# Patient Record
Sex: MALE | Race: ASIAN | NOT HISPANIC OR LATINO | Employment: OTHER | URBAN - METROPOLITAN AREA
[De-identification: names, ages, dates, MRNs, and addresses within clinical notes are randomized per-mention and may not be internally consistent; named-entity substitution may affect disease eponyms.]

---

## 2017-06-27 ENCOUNTER — OFFICE VISIT (OUTPATIENT)
Dept: INTERNAL MEDICINE | Facility: CLINIC | Age: 47
End: 2017-06-27
Payer: COMMERCIAL

## 2017-06-27 VITALS
WEIGHT: 121.25 LBS | OXYGEN SATURATION: 99 % | TEMPERATURE: 98 F | HEART RATE: 82 BPM | SYSTOLIC BLOOD PRESSURE: 126 MMHG | HEIGHT: 68 IN | BODY MASS INDEX: 18.38 KG/M2 | DIASTOLIC BLOOD PRESSURE: 86 MMHG

## 2017-06-27 DIAGNOSIS — J06.9 VIRAL URI WITH COUGH: Primary | ICD-10-CM

## 2017-06-27 PROCEDURE — 99999 PR PBB SHADOW E&M-EST. PATIENT-LVL III: CPT | Mod: PBBFAC,,, | Performed by: INTERNAL MEDICINE

## 2017-06-27 PROCEDURE — 99213 OFFICE O/P EST LOW 20 MIN: CPT | Mod: S$GLB,,, | Performed by: INTERNAL MEDICINE

## 2017-06-27 NOTE — PROGRESS NOTES
Subjective:       Patient ID: Dolly Torres is a 46 y.o. male.    Chief Complaint: Cough (x 4 days yellow mucus otc cough medicine some relief)    HPI    C/o cough x4-5 days getting progressively worse. Pt's wife, son and daughter all recently ill. + productive cough in AM.   No runny nose, sore throat.  Itchy feeling in throat. No fever, chills, sob, cp.    Concerned b/c 3 y/o son had mycooplasma and daughter and wife were also ill with similar symptoms  Review of Systems   Constitutional: Negative for activity change, appetite change, chills, fatigue, fever and unexpected weight change.   Respiratory: Positive for cough. Negative for chest tightness and shortness of breath.    Cardiovascular: Negative for chest pain and leg swelling.   Gastrointestinal: Negative for abdominal pain.   Neurological: Negative for headaches.   Psychiatric/Behavioral: Negative for dysphoric mood.       Objective:      Physical Exam   Constitutional: He is oriented to person, place, and time. He appears well-developed and well-nourished.   HENT:   Mouth/Throat: Oropharynx is clear and moist. No oropharyngeal exudate.   Tm's clear   Eyes: Right eye exhibits no discharge. Left eye exhibits no discharge. No scleral icterus.   Neck: Normal range of motion. Neck supple. No JVD present. No thyromegaly present.   Cardiovascular: Normal rate, regular rhythm and normal heart sounds.    Pulmonary/Chest: Effort normal and breath sounds normal. No respiratory distress. He has no wheezes. He has no rales.   Abdominal: Soft. He exhibits no mass. There is no tenderness.   Musculoskeletal: He exhibits no edema.   Lymphadenopathy:     He has no cervical adenopathy.   Neurological: He is alert and oriented to person, place, and time.   Psychiatric: He has a normal mood and affect. His behavior is normal.       Assessment:       1. Viral URI with cough        Plan:       Dolly was seen today for cough.    Diagnoses and all orders for this visit:    Viral URI  with cough       Reassurance.  otc cough suppressants, decongestants if post nasal drip bothersome.    Call if no better.

## 2017-09-21 ENCOUNTER — IMMUNIZATION (OUTPATIENT)
Dept: INTERNAL MEDICINE | Facility: CLINIC | Age: 47
End: 2017-09-21
Payer: COMMERCIAL

## 2017-09-21 ENCOUNTER — OFFICE VISIT (OUTPATIENT)
Dept: INTERNAL MEDICINE | Facility: CLINIC | Age: 47
End: 2017-09-21
Payer: COMMERCIAL

## 2017-09-21 VITALS
WEIGHT: 126.75 LBS | HEIGHT: 68 IN | SYSTOLIC BLOOD PRESSURE: 132 MMHG | BODY MASS INDEX: 19.21 KG/M2 | DIASTOLIC BLOOD PRESSURE: 72 MMHG | HEART RATE: 76 BPM

## 2017-09-21 DIAGNOSIS — Z23 NEED FOR INFLUENZA VACCINATION: ICD-10-CM

## 2017-09-21 DIAGNOSIS — Z23 NEED FOR DIPHTHERIA-TETANUS-PERTUSSIS (TDAP) VACCINE: ICD-10-CM

## 2017-09-21 DIAGNOSIS — Z00.00 ANNUAL PHYSICAL EXAM: Primary | ICD-10-CM

## 2017-09-21 DIAGNOSIS — R39.89 ABNORMAL URINE COLOR: ICD-10-CM

## 2017-09-21 DIAGNOSIS — D18.03 HEMANGIOMA OF LIVER: ICD-10-CM

## 2017-09-21 DIAGNOSIS — R10.9 STOMACH DISCOMFORT: ICD-10-CM

## 2017-09-21 LAB
BILIRUB UR QL STRIP: NEGATIVE
CLARITY UR REFRACT.AUTO: ABNORMAL
COLOR UR AUTO: YELLOW
GLUCOSE UR QL STRIP: NEGATIVE
HGB UR QL STRIP: NEGATIVE
KETONES UR QL STRIP: NEGATIVE
LEUKOCYTE ESTERASE UR QL STRIP: NEGATIVE
MICROSCOPIC COMMENT: NORMAL
NITRITE UR QL STRIP: NEGATIVE
PH UR STRIP: 6 [PH] (ref 5–8)
PROT UR QL STRIP: NEGATIVE
SP GR UR STRIP: 1.02 (ref 1–1.03)
URN SPEC COLLECT METH UR: ABNORMAL
UROBILINOGEN UR STRIP-ACNC: NEGATIVE EU/DL
WBC #/AREA URNS AUTO: 1 /HPF (ref 0–5)

## 2017-09-21 PROCEDURE — 81001 URINALYSIS AUTO W/SCOPE: CPT

## 2017-09-21 PROCEDURE — 90715 TDAP VACCINE 7 YRS/> IM: CPT | Mod: S$GLB,,, | Performed by: INTERNAL MEDICINE

## 2017-09-21 PROCEDURE — 99999 PR PBB SHADOW E&M-EST. PATIENT-LVL III: CPT | Mod: PBBFAC,,, | Performed by: INTERNAL MEDICINE

## 2017-09-21 PROCEDURE — 90686 IIV4 VACC NO PRSV 0.5 ML IM: CPT | Mod: S$GLB,,, | Performed by: INTERNAL MEDICINE

## 2017-09-21 PROCEDURE — 90472 IMMUNIZATION ADMIN EACH ADD: CPT | Mod: S$GLB,,, | Performed by: INTERNAL MEDICINE

## 2017-09-21 PROCEDURE — 99396 PREV VISIT EST AGE 40-64: CPT | Mod: 25,S$GLB,, | Performed by: INTERNAL MEDICINE

## 2017-09-21 PROCEDURE — 90471 IMMUNIZATION ADMIN: CPT | Mod: S$GLB,,, | Performed by: INTERNAL MEDICINE

## 2017-09-21 NOTE — PROGRESS NOTES
"Subjective:       Patient ID: Dolly Torres is a 47 y.o. male.    Chief Complaint: Annual Exam    HPI    Last visit with me 07/2016. No new problems or issues. occasionally still with very mild abdominal pain but not severe or increasing in frequency.    Reviewed PMH, PSH, SH, FH, allergies, and medications.     Review of Systems   All other systems reviewed and are negative.      Objective:      Physical Exam   Constitutional: He is oriented to person, place, and time. No distress.   HENT:   Head: Atraumatic.   Right Ear: Tympanic membrane normal.   Left Ear: Tympanic membrane normal.   Mouth/Throat: Oropharynx is clear and moist. No oropharyngeal exudate.   Eyes: Pupils are equal, round, and reactive to light. Right eye exhibits no discharge. Left eye exhibits no discharge.   Neck: Normal range of motion. No thyromegaly present.   Cardiovascular: Normal rate, regular rhythm and normal heart sounds.    Pulmonary/Chest: Effort normal and breath sounds normal. He has no wheezes. He has no rales.   Abdominal: Soft. He exhibits no distension and no mass. There is no tenderness. There is no guarding.   Musculoskeletal: He exhibits no edema or tenderness.   Lymphadenopathy:     He has no cervical adenopathy.   Neurological: He is alert and oriented to person, place, and time.   Skin: Skin is warm and dry. No rash noted.   Psychiatric: He has a normal mood and affect. His behavior is normal.   Nursing note and vitals reviewed.      Vitals:    09/21/17 1427   BP: 132/72   BP Location: Right arm   Patient Position: Sitting   BP Method: Medium (Manual)   Pulse: 76   Weight: 57.5 kg (126 lb 12.2 oz)   Height: 5' 8" (1.727 m)     Body mass index is 19.27 kg/m².    RESULTS: Reviewed labs from last 18 months    The 10-year ASCVD risk score ([place] KELLY Jr., et al., 2013) is: 1.4%    Values used to calculate the score:      Age: 47 years      Sex: Male      Is Non- : No      Diabetic: No      Tobacco smoker: No     "  Systolic Blood Pressure: 132 mmHg      Is BP treated: No      HDL Cholesterol: 84 mg/dL      Total Cholesterol: 207 mg/dL     Assessment:       1. Annual physical exam    2. Hemangioma of liver    3. Need for influenza vaccination    4. Need for diphtheria-tetanus-pertussis (Tdap) vaccine    5. Abnormal urine color    6. Stomach discomfort        Plan:   Dolly was seen today for annual exam.    Diagnoses and all orders for this visit:    Annual physical exam:  Age-appropriate health screening reviewed, indicated tests ordered.   -     Comprehensive metabolic panel; Future  -     Bilirubin, direct; Future    Hemangioma of liver:  Stable, aim for repeat ultrasound in 2019.    Need for influenza vaccination    Need for diphtheria-tetanus-pertussis (Tdap) vaccine  -     Tdap Vaccine    Abnormal urine color:  Assess for prostate or urine problems on labs.  -     Urinalysis  -     Prostate Specific Antigen, Diagnostic; Future    Stomach discomfort:  Mild stomach symptoms, not severe, given recurrent nature will rule out pancreas inflammation. otherwise mild dyspepsia, use over-the-counter meds for relief as needed.  -     Lipase; Future    Other orders  -     Urinalysis Microscopic    Return in about 1 year (around 9/21/2018) for EPP annual exam, fasting labs 1 week prior.   Kurt Fields MD  Internal Medicine    Portions of this note were completed using [employer] dictation software. Please excuse typographical or syntax errors.

## 2017-09-29 ENCOUNTER — LAB VISIT (OUTPATIENT)
Dept: LAB | Facility: HOSPITAL | Age: 47
End: 2017-09-29
Attending: INTERNAL MEDICINE
Payer: COMMERCIAL

## 2017-09-29 DIAGNOSIS — R10.9 STOMACH DISCOMFORT: ICD-10-CM

## 2017-09-29 DIAGNOSIS — Z00.00 ANNUAL PHYSICAL EXAM: ICD-10-CM

## 2017-09-29 DIAGNOSIS — R39.89 ABNORMAL URINE COLOR: ICD-10-CM

## 2017-09-29 LAB
ALBUMIN SERPL BCP-MCNC: 4.1 G/DL
ALP SERPL-CCNC: 65 U/L
ALT SERPL W/O P-5'-P-CCNC: 18 U/L
ANION GAP SERPL CALC-SCNC: 11 MMOL/L
AST SERPL-CCNC: 20 U/L
BILIRUB DIRECT SERPL-MCNC: 0.3 MG/DL
BILIRUB SERPL-MCNC: 0.7 MG/DL
BUN SERPL-MCNC: 18 MG/DL
CALCIUM SERPL-MCNC: 9.1 MG/DL
CHLORIDE SERPL-SCNC: 108 MMOL/L
CO2 SERPL-SCNC: 24 MMOL/L
COMPLEXED PSA SERPL-MCNC: 0.87 NG/ML
CREAT SERPL-MCNC: 0.8 MG/DL
EST. GFR  (AFRICAN AMERICAN): >60 ML/MIN/1.73 M^2
EST. GFR  (NON AFRICAN AMERICAN): >60 ML/MIN/1.73 M^2
GLUCOSE SERPL-MCNC: 98 MG/DL
LIPASE SERPL-CCNC: 34 U/L
POTASSIUM SERPL-SCNC: 4.2 MMOL/L
PROT SERPL-MCNC: 7.3 G/DL
SODIUM SERPL-SCNC: 143 MMOL/L

## 2017-09-29 PROCEDURE — 83690 ASSAY OF LIPASE: CPT

## 2017-09-29 PROCEDURE — 80053 COMPREHEN METABOLIC PANEL: CPT

## 2017-09-29 PROCEDURE — 82248 BILIRUBIN DIRECT: CPT

## 2017-09-29 PROCEDURE — 36415 COLL VENOUS BLD VENIPUNCTURE: CPT

## 2017-09-29 PROCEDURE — 84153 ASSAY OF PSA TOTAL: CPT

## 2018-11-09 ENCOUNTER — IMMUNIZATION (OUTPATIENT)
Dept: INTERNAL MEDICINE | Facility: CLINIC | Age: 48
End: 2018-11-09
Payer: COMMERCIAL

## 2018-11-09 ENCOUNTER — OFFICE VISIT (OUTPATIENT)
Dept: INTERNAL MEDICINE | Facility: CLINIC | Age: 48
End: 2018-11-09
Payer: COMMERCIAL

## 2018-11-09 VITALS
HEIGHT: 68 IN | WEIGHT: 114 LBS | HEART RATE: 82 BPM | BODY MASS INDEX: 17.28 KG/M2 | SYSTOLIC BLOOD PRESSURE: 126 MMHG | DIASTOLIC BLOOD PRESSURE: 82 MMHG

## 2018-11-09 DIAGNOSIS — D18.03 HEMANGIOMA OF LIVER: ICD-10-CM

## 2018-11-09 DIAGNOSIS — Z78.9 ALCOHOL USE: ICD-10-CM

## 2018-11-09 DIAGNOSIS — Z23 NEED FOR INFLUENZA VACCINATION: ICD-10-CM

## 2018-11-09 DIAGNOSIS — Z00.00 ANNUAL PHYSICAL EXAM: Primary | ICD-10-CM

## 2018-11-09 DIAGNOSIS — R10.11 RIGHT UPPER QUADRANT ABDOMINAL PAIN: ICD-10-CM

## 2018-11-09 DIAGNOSIS — Z98.890 HISTORY OF ENDOSCOPY: ICD-10-CM

## 2018-11-09 PROCEDURE — 90686 IIV4 VACC NO PRSV 0.5 ML IM: CPT | Mod: S$GLB,,, | Performed by: INTERNAL MEDICINE

## 2018-11-09 PROCEDURE — 99999 PR PBB SHADOW E&M-EST. PATIENT-LVL III: CPT | Mod: PBBFAC,,, | Performed by: INTERNAL MEDICINE

## 2018-11-09 PROCEDURE — 99396 PREV VISIT EST AGE 40-64: CPT | Mod: S$GLB,,, | Performed by: INTERNAL MEDICINE

## 2018-11-09 PROCEDURE — 90471 IMMUNIZATION ADMIN: CPT | Mod: S$GLB,,, | Performed by: INTERNAL MEDICINE

## 2018-11-09 NOTE — PROGRESS NOTES
"Subjective:       Patient ID: Dolly Torres is a 48 y.o. male.    Chief Complaint: Annual Exam    HPI    Last visit with me over 1 year ago. Sometimes with some feelings of abnormal discomfort (not quite pain) in RUQ when full. Has history of pancreatitis. Some insomnia recently given stress of mother's cancer dx.    Reviewed PMH, PSH, SH, FH, allergies, and medications.     Review of Systems   All other systems reviewed and are negative.      Objective:      Physical Exam   Constitutional: He is oriented to person, place, and time. No distress.   HENT:   Head: Atraumatic.   Right Ear: Tympanic membrane normal. No tenderness.   Left Ear: Tympanic membrane normal. No tenderness.   Mouth/Throat: Oropharynx is clear and moist. No oropharyngeal exudate.   Eyes: Pupils are equal, round, and reactive to light. Right eye exhibits no discharge. Left eye exhibits no discharge.   Neck: Normal range of motion. No thyromegaly present.   Cardiovascular: Normal rate, regular rhythm and normal heart sounds.   Pulmonary/Chest: Effort normal and breath sounds normal. No stridor. He has no wheezes. He has no rales.   Abdominal: Soft. Bowel sounds are normal. He exhibits no distension and no mass. There is no tenderness. There is no guarding.   Musculoskeletal: He exhibits no edema or tenderness.   Lymphadenopathy:     He has no cervical adenopathy.   Neurological: He is alert and oriented to person, place, and time.   Skin: Skin is warm and dry. No rash noted.   Psychiatric: He has a normal mood and affect. His behavior is normal.   Nursing note and vitals reviewed.      Vitals:    11/09/18 1447   BP: 126/82   BP Location: Right arm   Patient Position: Sitting   BP Method: Small (Manual)   Pulse: 82   Weight: 51.7 kg (114 lb)   Height: 5' 8" (1.727 m)     Body mass index is 17.33 kg/m².    RESULTS: Reviewed labs from last 12 months    Assessment:       1. Annual physical exam    2. Hemangioma of liver -- repeat ultrasound 06/2019    3. " Right upper quadrant abdominal pain    4. History of endoscopy -- repeat EGD in 2019    5. Alcohol use    6. Need for influenza vaccination        Plan:   Dolly was seen today for annual exam.    Diagnoses and all orders for this visit:    Annual physical exam:  Age-appropriate health screening reviewed, indicated tests ordered.   -     Comprehensive metabolic panel; Future  -     CBC Without Differential; Future  -     Lipid panel; Future  -     Lipase; Future  -     Comprehensive metabolic panel; Future    Hemangioma of liver -- repeat ultrasound 06/2019:  Message placed to order and schedule once we get to May 2019.  -     Cancel: US Abdomen Limited; Future    Right upper quadrant abdominal pain:  Prior dx, longstanding, episodic and not severe. possibly scar tissue following cholecystectomy. Check ultrasound 2019.    History of endoscopy -- repeat EGD in 2019    Alcohol use:  Pt regularly drinks close to 3 beers daily, encourage limiting to no more than 2 beers if drinking most days per week.    Need for influenza vaccination    Follow-up in about 1 year (around 11/9/2019) for EPP annual exam, fasting labs 1 week prior. Next yr will be due for repeat EGD, consider getting colonoscopy at same time.    Kurt Fields MD  Internal Medicine    Portions of this note were completed using medical dictation software. Please excuse typographical or syntax errors that were missed on review.

## 2018-11-14 ENCOUNTER — LAB VISIT (OUTPATIENT)
Dept: LAB | Facility: HOSPITAL | Age: 48
End: 2018-11-14
Attending: INTERNAL MEDICINE
Payer: COMMERCIAL

## 2018-11-14 DIAGNOSIS — Z00.00 ANNUAL PHYSICAL EXAM: ICD-10-CM

## 2018-11-14 LAB
ALBUMIN SERPL BCP-MCNC: 4.2 G/DL
ALP SERPL-CCNC: 56 U/L
ALT SERPL W/O P-5'-P-CCNC: 18 U/L
ANION GAP SERPL CALC-SCNC: 8 MMOL/L
AST SERPL-CCNC: 23 U/L
BILIRUB SERPL-MCNC: 0.8 MG/DL
BUN SERPL-MCNC: 17 MG/DL
CALCIUM SERPL-MCNC: 9.3 MG/DL
CHLORIDE SERPL-SCNC: 105 MMOL/L
CHOLEST SERPL-MCNC: 203 MG/DL
CHOLEST/HDLC SERPL: 2.3 {RATIO}
CO2 SERPL-SCNC: 27 MMOL/L
CREAT SERPL-MCNC: 0.8 MG/DL
ERYTHROCYTE [DISTWIDTH] IN BLOOD BY AUTOMATED COUNT: 12.5 %
EST. GFR  (AFRICAN AMERICAN): >60 ML/MIN/1.73 M^2
EST. GFR  (NON AFRICAN AMERICAN): >60 ML/MIN/1.73 M^2
GLUCOSE SERPL-MCNC: 95 MG/DL
HCT VFR BLD AUTO: 43.8 %
HDLC SERPL-MCNC: 88 MG/DL
HDLC SERPL: 43.3 %
HGB BLD-MCNC: 14.6 G/DL
LDLC SERPL CALC-MCNC: 106.6 MG/DL
LIPASE SERPL-CCNC: 31 U/L
MCH RBC QN AUTO: 29.7 PG
MCHC RBC AUTO-ENTMCNC: 33.3 G/DL
MCV RBC AUTO: 89 FL
NONHDLC SERPL-MCNC: 115 MG/DL
PLATELET # BLD AUTO: 213 K/UL
PMV BLD AUTO: 10 FL
POTASSIUM SERPL-SCNC: 4 MMOL/L
PROT SERPL-MCNC: 6.9 G/DL
RBC # BLD AUTO: 4.92 M/UL
SODIUM SERPL-SCNC: 140 MMOL/L
TRIGL SERPL-MCNC: 42 MG/DL
WBC # BLD AUTO: 4.01 K/UL

## 2018-11-14 PROCEDURE — 80061 LIPID PANEL: CPT

## 2018-11-14 PROCEDURE — 36415 COLL VENOUS BLD VENIPUNCTURE: CPT

## 2018-11-14 PROCEDURE — 80053 COMPREHEN METABOLIC PANEL: CPT

## 2018-11-14 PROCEDURE — 85027 COMPLETE CBC AUTOMATED: CPT

## 2018-11-14 PROCEDURE — 83690 ASSAY OF LIPASE: CPT

## 2019-05-16 ENCOUNTER — TELEPHONE (OUTPATIENT)
Dept: INTERNAL MEDICINE | Facility: CLINIC | Age: 49
End: 2019-05-16

## 2019-05-16 DIAGNOSIS — D18.03 HEMANGIOMA OF LIVER: ICD-10-CM

## 2019-05-16 DIAGNOSIS — Z98.890 HISTORY OF ENDOSCOPY: Primary | ICD-10-CM

## 2019-07-26 ENCOUNTER — HOSPITAL ENCOUNTER (OUTPATIENT)
Dept: RADIOLOGY | Facility: HOSPITAL | Age: 49
Discharge: HOME | End: 2019-07-26
Attending: INTERNAL MEDICINE
Payer: COMMERCIAL

## 2019-07-26 DIAGNOSIS — D18.03 HEMANGIOMA OF LIVER: ICD-10-CM

## 2019-07-26 DIAGNOSIS — Z98.890 HISTORY OF ENDOSCOPY: ICD-10-CM

## 2019-07-26 PROCEDURE — 76705 US ABDOMEN LIMITED: ICD-10-PCS | Mod: 26,,, | Performed by: RADIOLOGY

## 2019-07-26 PROCEDURE — 76705 ECHO EXAM OF ABDOMEN: CPT | Mod: TC

## 2019-07-26 PROCEDURE — 76705 ECHO EXAM OF ABDOMEN: CPT | Mod: 26,,, | Performed by: RADIOLOGY

## 2019-08-07 ENCOUNTER — TELEPHONE (OUTPATIENT)
Dept: INTERNAL MEDICINE | Facility: CLINIC | Age: 49
End: 2019-08-07

## 2019-08-07 DIAGNOSIS — K83.8 DILATED BILE DUCT: Primary | ICD-10-CM

## 2019-08-08 ENCOUNTER — PATIENT MESSAGE (OUTPATIENT)
Dept: INTERNAL MEDICINE | Facility: CLINIC | Age: 49
End: 2019-08-08

## 2019-08-08 ENCOUNTER — LAB VISIT (OUTPATIENT)
Dept: LAB | Facility: HOSPITAL | Age: 49
End: 2019-08-08
Attending: INTERNAL MEDICINE
Payer: COMMERCIAL

## 2019-08-08 DIAGNOSIS — K83.8 DILATED BILE DUCT: ICD-10-CM

## 2019-08-08 LAB
ALBUMIN SERPL BCP-MCNC: 4.2 G/DL (ref 3.5–5.2)
ALP SERPL-CCNC: 66 U/L (ref 55–135)
ALT SERPL W/O P-5'-P-CCNC: 16 U/L (ref 10–44)
ANION GAP SERPL CALC-SCNC: 8 MMOL/L (ref 8–16)
AST SERPL-CCNC: 20 U/L (ref 10–40)
BILIRUB DIRECT SERPL-MCNC: 0.3 MG/DL (ref 0.1–0.3)
BILIRUB SERPL-MCNC: 0.8 MG/DL (ref 0.1–1)
BUN SERPL-MCNC: 18 MG/DL (ref 6–20)
CALCIUM SERPL-MCNC: 9.7 MG/DL (ref 8.7–10.5)
CHLORIDE SERPL-SCNC: 108 MMOL/L (ref 95–110)
CO2 SERPL-SCNC: 26 MMOL/L (ref 23–29)
CREAT SERPL-MCNC: 0.9 MG/DL (ref 0.5–1.4)
ERYTHROCYTE [DISTWIDTH] IN BLOOD BY AUTOMATED COUNT: 12.6 % (ref 11.5–14.5)
EST. GFR  (AFRICAN AMERICAN): >60 ML/MIN/1.73 M^2
EST. GFR  (NON AFRICAN AMERICAN): >60 ML/MIN/1.73 M^2
GLUCOSE SERPL-MCNC: 101 MG/DL (ref 70–110)
HCT VFR BLD AUTO: 43.2 % (ref 40–54)
HGB BLD-MCNC: 14.8 G/DL (ref 14–18)
MCH RBC QN AUTO: 30.3 PG (ref 27–31)
MCHC RBC AUTO-ENTMCNC: 34.3 G/DL (ref 32–36)
MCV RBC AUTO: 89 FL (ref 82–98)
PLATELET # BLD AUTO: 238 K/UL (ref 150–350)
PMV BLD AUTO: 9.5 FL (ref 9.2–12.9)
POTASSIUM SERPL-SCNC: 4.3 MMOL/L (ref 3.5–5.1)
PROT SERPL-MCNC: 6.9 G/DL (ref 6–8.4)
RBC # BLD AUTO: 4.88 M/UL (ref 4.6–6.2)
SODIUM SERPL-SCNC: 142 MMOL/L (ref 136–145)
WBC # BLD AUTO: 5.27 K/UL (ref 3.9–12.7)

## 2019-08-08 PROCEDURE — 80048 BASIC METABOLIC PNL TOTAL CA: CPT

## 2019-08-08 PROCEDURE — 80076 HEPATIC FUNCTION PANEL: CPT

## 2019-08-08 PROCEDURE — 85027 COMPLETE CBC AUTOMATED: CPT

## 2019-08-08 PROCEDURE — 36415 COLL VENOUS BLD VENIPUNCTURE: CPT

## 2019-08-29 ENCOUNTER — HOSPITAL ENCOUNTER (OUTPATIENT)
Dept: RADIOLOGY | Facility: HOSPITAL | Age: 49
Discharge: HOME | End: 2019-08-29
Attending: INTERNAL MEDICINE
Payer: COMMERCIAL

## 2019-08-29 DIAGNOSIS — K83.8 DILATED BILE DUCT: ICD-10-CM

## 2019-08-29 PROCEDURE — 76376 3D RENDER W/INTRP POSTPROCES: CPT | Mod: 26,,, | Performed by: RADIOLOGY

## 2019-08-29 PROCEDURE — 76376 MRI ABDOMEN WITHOUT CONTRAST MRCP: ICD-10-PCS | Mod: 26,,, | Performed by: RADIOLOGY

## 2019-08-29 PROCEDURE — 74181 MRI ABDOMEN WITHOUT CONTRAST MRCP: ICD-10-PCS | Mod: 26,,, | Performed by: RADIOLOGY

## 2019-08-29 PROCEDURE — 76376 3D RENDER W/INTRP POSTPROCES: CPT | Mod: TC

## 2019-08-29 PROCEDURE — 74181 MRI ABDOMEN W/O CONTRAST: CPT | Mod: 26,,, | Performed by: RADIOLOGY

## 2019-08-29 PROCEDURE — 74181 MRI ABDOMEN W/O CONTRAST: CPT | Mod: TC

## 2019-09-12 ENCOUNTER — OFFICE VISIT (OUTPATIENT)
Dept: INTERNAL MEDICINE | Facility: CLINIC | Age: 49
End: 2019-09-12
Payer: COMMERCIAL

## 2019-09-12 VITALS
HEIGHT: 68 IN | BODY MASS INDEX: 18.27 KG/M2 | HEART RATE: 77 BPM | WEIGHT: 120.56 LBS | SYSTOLIC BLOOD PRESSURE: 116 MMHG | DIASTOLIC BLOOD PRESSURE: 78 MMHG

## 2019-09-12 DIAGNOSIS — Z12.5 PROSTATE CANCER SCREENING: ICD-10-CM

## 2019-09-12 DIAGNOSIS — D18.03 HEMANGIOMA OF LIVER: ICD-10-CM

## 2019-09-12 DIAGNOSIS — Z00.00 ANNUAL PHYSICAL EXAM: Primary | ICD-10-CM

## 2019-09-12 DIAGNOSIS — K83.8 COMMON BILE DUCT DILATATION: ICD-10-CM

## 2019-09-12 PROCEDURE — 99999 PR PBB SHADOW E&M-EST. PATIENT-LVL III: CPT | Mod: PBBFAC,,, | Performed by: INTERNAL MEDICINE

## 2019-09-12 PROCEDURE — 99396 PREV VISIT EST AGE 40-64: CPT | Mod: S$GLB,,, | Performed by: INTERNAL MEDICINE

## 2019-09-12 PROCEDURE — 99999 PR PBB SHADOW E&M-EST. PATIENT-LVL III: ICD-10-PCS | Mod: PBBFAC,,, | Performed by: INTERNAL MEDICINE

## 2019-09-12 PROCEDURE — 99396 PR PREVENTIVE VISIT,EST,40-64: ICD-10-PCS | Mod: S$GLB,,, | Performed by: INTERNAL MEDICINE

## 2019-09-12 NOTE — PROGRESS NOTES
"Subjective:       Patient ID: Dolly Torres is a 48 y.o. male.    Chief Complaint: Annual Exam    HPI    No new problems or issues.  Last visit with me 1 year ago.  Continues to have RUQ discomfort occasionally but not worsening; no nausea or vomiting, no diarrhea or constipation.    Reviewed PMH, PSH, SH, FH, allergies, and medications.     Review of Systems   All other systems reviewed and are negative.      Objective:      Physical Exam   Constitutional: He is oriented to person, place, and time. No distress.   HENT:   Head: Atraumatic.   Right Ear: Tympanic membrane normal. No tenderness.   Left Ear: Tympanic membrane normal. No tenderness.   Mouth/Throat: Oropharynx is clear and moist. No oropharyngeal exudate.   Eyes: Pupils are equal, round, and reactive to light. Right eye exhibits no discharge. Left eye exhibits no discharge.   Neck: Normal range of motion. No thyromegaly present.   Cardiovascular: Normal rate, regular rhythm and normal heart sounds.   Pulmonary/Chest: Effort normal and breath sounds normal. No stridor. He has no wheezes. He has no rales.   Abdominal: Soft. He exhibits no distension and no mass. There is no tenderness. There is no guarding.   Musculoskeletal: He exhibits no edema or tenderness.   Lymphadenopathy:     He has no cervical adenopathy.   Neurological: He is alert and oriented to person, place, and time.   Skin: Skin is warm and dry. No rash noted.   Psychiatric: He has a normal mood and affect. His behavior is normal.   Nursing note and vitals reviewed.      Vitals:    09/12/19 1459   BP: 116/78   BP Location: Right arm   Patient Position: Sitting   BP Method: Medium (Manual)   Pulse: 77   Weight: 54.7 kg (120 lb 9.5 oz)   Height: 5' 8" (1.727 m)     Body mass index is 18.34 kg/m².    RESULTS: Reviewed labs from last 12 months    Assessment:       1. Annual physical exam    2. Common bile duct dilatation    3. Hemangioma of liver -- repeat ultrasound 06/2019    4. Prostate cancer " screening        Plan:   Dolly was seen today for annual exam.    Diagnoses and all orders for this visit:    Annual physical exam:  Age-appropriate health screening reviewed, indicated tests ordered.   -     Comprehensive metabolic panel; Future  -     CBC Without Differential; Future  -     PSA, Screening; Future  -     Bilirubin, direct; Future    Common bile duct dilatation:  Prior dx on ultrasound. Asymptomatic. Recheck in 1 yr, if still dilated order EUS.  -     Bilirubin, direct; Future    Hemangioma of liver -- repeat ultrasound 06/2019  -     Comprehensive metabolic panel; Future  -     CBC Without Differential; Future  -     Bilirubin, direct; Future    Prostate cancer screening  -     PSA, Screening; Future    Follow up in about 1 year (around 9/12/2020) for EPP annual exam, fasting labs 1 week prior.  Kurt Fields MD  Internal Medicine    Portions of this note were completed using medical dictation software. Please excuse typographical or syntax errors that were missed on review.

## 2019-10-18 ENCOUNTER — IMMUNIZATION (OUTPATIENT)
Dept: PHARMACY | Facility: CLINIC | Age: 49
End: 2019-10-18
Payer: COMMERCIAL

## 2020-07-10 ENCOUNTER — TELEPHONE (OUTPATIENT)
Dept: INTERNAL MEDICINE | Facility: CLINIC | Age: 50
End: 2020-07-10

## 2020-10-05 ENCOUNTER — PATIENT MESSAGE (OUTPATIENT)
Dept: ADMINISTRATIVE | Facility: HOSPITAL | Age: 50
End: 2020-10-05

## 2020-10-20 ENCOUNTER — IMMUNIZATION (OUTPATIENT)
Dept: INTERNAL MEDICINE | Facility: CLINIC | Age: 50
End: 2020-10-20
Payer: COMMERCIAL

## 2020-10-20 PROCEDURE — 90471 FLU VACCINE (QUAD) GREATER THAN OR EQUAL TO 3YO PRESERVATIVE FREE IM: ICD-10-PCS | Mod: S$GLB,,, | Performed by: INTERNAL MEDICINE

## 2020-10-20 PROCEDURE — 90686 FLU VACCINE (QUAD) GREATER THAN OR EQUAL TO 3YO PRESERVATIVE FREE IM: ICD-10-PCS | Mod: S$GLB,,, | Performed by: INTERNAL MEDICINE

## 2020-10-20 PROCEDURE — 90686 IIV4 VACC NO PRSV 0.5 ML IM: CPT | Mod: S$GLB,,, | Performed by: INTERNAL MEDICINE

## 2020-10-20 PROCEDURE — 90471 IMMUNIZATION ADMIN: CPT | Mod: S$GLB,,, | Performed by: INTERNAL MEDICINE

## 2020-10-24 ENCOUNTER — OFFICE VISIT (OUTPATIENT)
Dept: INTERNAL MEDICINE | Facility: CLINIC | Age: 50
End: 2020-10-24
Payer: COMMERCIAL

## 2020-10-24 ENCOUNTER — LAB VISIT (OUTPATIENT)
Dept: LAB | Facility: HOSPITAL | Age: 50
End: 2020-10-24
Attending: INTERNAL MEDICINE
Payer: COMMERCIAL

## 2020-10-24 VITALS
HEART RATE: 80 BPM | OXYGEN SATURATION: 99 % | SYSTOLIC BLOOD PRESSURE: 130 MMHG | BODY MASS INDEX: 19.18 KG/M2 | WEIGHT: 129.5 LBS | DIASTOLIC BLOOD PRESSURE: 82 MMHG | HEIGHT: 69 IN

## 2020-10-24 DIAGNOSIS — Z98.890 HISTORY OF ENDOSCOPY: ICD-10-CM

## 2020-10-24 DIAGNOSIS — Z00.00 ANNUAL PHYSICAL EXAM: ICD-10-CM

## 2020-10-24 DIAGNOSIS — D18.03 HEMANGIOMA OF LIVER: ICD-10-CM

## 2020-10-24 DIAGNOSIS — Z00.00 ANNUAL PHYSICAL EXAM: Primary | ICD-10-CM

## 2020-10-24 LAB
ALBUMIN SERPL BCP-MCNC: 4.7 G/DL (ref 3.5–5.2)
ALP SERPL-CCNC: 80 U/L (ref 55–135)
ALT SERPL W/O P-5'-P-CCNC: 21 U/L (ref 10–44)
ANION GAP SERPL CALC-SCNC: 12 MMOL/L (ref 8–16)
AST SERPL-CCNC: 23 U/L (ref 10–40)
BILIRUB DIRECT SERPL-MCNC: 0.3 MG/DL (ref 0.1–0.3)
BILIRUB SERPL-MCNC: 0.9 MG/DL (ref 0.1–1)
BUN SERPL-MCNC: 19 MG/DL (ref 6–20)
CALCIUM SERPL-MCNC: 9.2 MG/DL (ref 8.7–10.5)
CHLORIDE SERPL-SCNC: 102 MMOL/L (ref 95–110)
CO2 SERPL-SCNC: 26 MMOL/L (ref 23–29)
CREAT SERPL-MCNC: 0.8 MG/DL (ref 0.5–1.4)
EST. GFR  (AFRICAN AMERICAN): >60 ML/MIN/1.73 M^2
EST. GFR  (NON AFRICAN AMERICAN): >60 ML/MIN/1.73 M^2
GLUCOSE SERPL-MCNC: 88 MG/DL (ref 70–110)
POTASSIUM SERPL-SCNC: 3.9 MMOL/L (ref 3.5–5.1)
PROT SERPL-MCNC: 7.9 G/DL (ref 6–8.4)
SODIUM SERPL-SCNC: 140 MMOL/L (ref 136–145)

## 2020-10-24 PROCEDURE — 80053 COMPREHEN METABOLIC PANEL: CPT

## 2020-10-24 PROCEDURE — 99396 PREV VISIT EST AGE 40-64: CPT | Mod: S$GLB,,, | Performed by: INTERNAL MEDICINE

## 2020-10-24 PROCEDURE — 99999 PR PBB SHADOW E&M-EST. PATIENT-LVL III: ICD-10-PCS | Mod: PBBFAC,,, | Performed by: INTERNAL MEDICINE

## 2020-10-24 PROCEDURE — 36415 COLL VENOUS BLD VENIPUNCTURE: CPT

## 2020-10-24 PROCEDURE — 99396 PR PREVENTIVE VISIT,EST,40-64: ICD-10-PCS | Mod: S$GLB,,, | Performed by: INTERNAL MEDICINE

## 2020-10-24 PROCEDURE — 99999 PR PBB SHADOW E&M-EST. PATIENT-LVL III: CPT | Mod: PBBFAC,,, | Performed by: INTERNAL MEDICINE

## 2020-10-24 PROCEDURE — 82248 BILIRUBIN DIRECT: CPT

## 2020-10-24 PROCEDURE — 3008F BODY MASS INDEX DOCD: CPT | Mod: CPTII,S$GLB,, | Performed by: INTERNAL MEDICINE

## 2020-10-24 PROCEDURE — 3008F PR BODY MASS INDEX (BMI) DOCUMENTED: ICD-10-PCS | Mod: CPTII,S$GLB,, | Performed by: INTERNAL MEDICINE

## 2020-10-24 NOTE — PROGRESS NOTES
"Subjective:       Patient ID: Dolly Torres is a 50 y.o. male.    Chief Complaint: Annual Exam      Last visit with me 9/12/2019.     HPI    Usual state of health. Some weight gain during pandemic but no new symptoms.    Reviewed PMH, PSH, SH, FH, allergies, and medications.     Review of Systems   All other systems reviewed and are negative.      Objective:      Physical Exam  Vitals signs and nursing note reviewed.   Constitutional:       General: He is not in acute distress.     Appearance: He is not diaphoretic.   HENT:      Head: Normocephalic and atraumatic.      Right Ear: External ear normal.      Left Ear: External ear normal.   Eyes:      General:         Right eye: No discharge.         Left eye: No discharge.      Conjunctiva/sclera: Conjunctivae normal.   Neck:      Musculoskeletal: Normal range of motion.      Thyroid: No thyromegaly.   Cardiovascular:      Rate and Rhythm: Normal rate and regular rhythm.      Heart sounds: Normal heart sounds.   Pulmonary:      Effort: Pulmonary effort is normal.      Breath sounds: Normal breath sounds. No stridor. No wheezing or rales.   Abdominal:      Palpations: Abdomen is soft.      Tenderness: There is no abdominal tenderness. There is no guarding.   Musculoskeletal:         General: No tenderness.      Right lower leg: No edema.      Left lower leg: No edema.   Lymphadenopathy:      Cervical: No cervical adenopathy.   Skin:     General: Skin is warm and dry.      Findings: No rash.   Neurological:      General: No focal deficit present.      Mental Status: He is alert and oriented to person, place, and time.   Psychiatric:         Mood and Affect: Mood normal.         Behavior: Behavior normal.         Vitals:    10/24/20 1537   BP: 130/82   BP Location: Right arm   Patient Position: Sitting   BP Method: Medium (Manual)   Pulse: 80   SpO2: 99%   Weight: 58.7 kg (129 lb 8 oz)   Height: 5' 9" (1.753 m)     Body mass index is 19.12 kg/m².    RESULTS: Reviewed labs " from last 18 months    Assessment:       1. Annual physical exam    2. History of endoscopy -- repeat EGD in 2020 w/ colonoscopy    3. Hemangioma of liver -- repeat ultrasound 2020        Plan:   Dolly was seen today for annual exam.    Diagnoses and all orders for this visit:    Annual physical exam:  Age-appropriate health screening reviewed, indicated tests ordered.   -     Comprehensive Metabolic Panel; Future  -     Bilirubin, Direct; Future    History of endoscopy -- repeat EGD in 2020 w/ colonoscopy    Hemangioma of liver -- repeat ultrasound 2020    Follow up in about 1 year (around 10/24/2021) for EPP annual exam. Labs today.  Kurt Fields MD, FACP  [facility]    Portions of this note were completed using medical dictation software. Please excuse typographical or syntax errors that were missed on review.

## 2021-03-26 ENCOUNTER — IMMUNIZATION (OUTPATIENT)
Dept: PRIMARY CARE CLINIC | Facility: CLINIC | Age: 51
End: 2021-03-26
Payer: COMMERCIAL

## 2021-03-26 DIAGNOSIS — Z23 NEED FOR VACCINATION: Primary | ICD-10-CM

## 2021-03-26 PROCEDURE — 0001A PR IMMUNIZ ADMIN, SARS-COV-2 COVID-19 VACC, 30MCG/0.3ML, 1ST DOSE: CPT | Mod: CV19,S$GLB,, | Performed by: INTERNAL MEDICINE

## 2021-03-26 PROCEDURE — 0001A PR IMMUNIZ ADMIN, SARS-COV-2 COVID-19 VACC, 30MCG/0.3ML, 1ST DOSE: ICD-10-PCS | Mod: CV19,S$GLB,, | Performed by: INTERNAL MEDICINE

## 2021-03-26 PROCEDURE — 91300 PR SARS-COV- 2 COVID-19 VACCINE, NO PRSV, 30MCG/0.3ML, IM: CPT | Mod: S$GLB,,, | Performed by: INTERNAL MEDICINE

## 2021-03-26 PROCEDURE — 91300 PR SARS-COV- 2 COVID-19 VACCINE, NO PRSV, 30MCG/0.3ML, IM: ICD-10-PCS | Mod: S$GLB,,, | Performed by: INTERNAL MEDICINE

## 2021-03-26 RX ADMIN — Medication 0.3 ML: at 03:03

## 2021-04-18 ENCOUNTER — IMMUNIZATION (OUTPATIENT)
Dept: PRIMARY CARE CLINIC | Facility: CLINIC | Age: 51
End: 2021-04-18
Payer: COMMERCIAL

## 2021-04-18 DIAGNOSIS — Z23 NEED FOR VACCINATION: Primary | ICD-10-CM

## 2021-04-18 PROCEDURE — 0002A PR IMMUNIZ ADMIN, SARS-COV-2 COVID-19 VACC, 30MCG/0.3ML, 2ND DOSE: ICD-10-PCS | Mod: CV19,S$GLB,, | Performed by: INTERNAL MEDICINE

## 2021-04-18 PROCEDURE — 91300 PR SARS-COV- 2 COVID-19 VACCINE, NO PRSV, 30MCG/0.3ML, IM: CPT | Mod: S$GLB,,, | Performed by: INTERNAL MEDICINE

## 2021-04-18 PROCEDURE — 91300 PR SARS-COV- 2 COVID-19 VACCINE, NO PRSV, 30MCG/0.3ML, IM: ICD-10-PCS | Mod: S$GLB,,, | Performed by: INTERNAL MEDICINE

## 2021-04-18 PROCEDURE — 0002A PR IMMUNIZ ADMIN, SARS-COV-2 COVID-19 VACC, 30MCG/0.3ML, 2ND DOSE: CPT | Mod: CV19,S$GLB,, | Performed by: INTERNAL MEDICINE

## 2021-04-18 RX ADMIN — Medication 0.3 ML: at 10:04

## 2024-02-23 ENCOUNTER — OFFICE VISIT (OUTPATIENT)
Dept: INTERNAL MEDICINE | Facility: CLINIC | Age: 54
End: 2024-02-23
Payer: COMMERCIAL

## 2024-02-23 VITALS
HEART RATE: 80 BPM | BODY MASS INDEX: 19.52 KG/M2 | OXYGEN SATURATION: 98 % | HEIGHT: 69 IN | WEIGHT: 131.81 LBS | SYSTOLIC BLOOD PRESSURE: 130 MMHG | DIASTOLIC BLOOD PRESSURE: 86 MMHG

## 2024-02-23 DIAGNOSIS — Z12.11 ENCOUNTER FOR SCREENING FOR MALIGNANT NEOPLASM OF COLON: ICD-10-CM

## 2024-02-23 DIAGNOSIS — D18.03 HEMANGIOMA OF LIVER: ICD-10-CM

## 2024-02-23 DIAGNOSIS — Z00.00 ENCOUNTER FOR ANNUAL GENERAL MEDICAL EXAMINATION WITHOUT ABNORMAL FINDINGS IN ADULT: Primary | ICD-10-CM

## 2024-02-23 DIAGNOSIS — Z76.89 ENCOUNTER TO ESTABLISH CARE WITH NEW DOCTOR: ICD-10-CM

## 2024-02-23 DIAGNOSIS — Z23 NEED FOR VACCINATION: ICD-10-CM

## 2024-02-23 DIAGNOSIS — Z12.5 ENCOUNTER FOR SCREENING FOR MALIGNANT NEOPLASM OF PROSTATE: ICD-10-CM

## 2024-02-23 PROCEDURE — 3075F SYST BP GE 130 - 139MM HG: CPT | Mod: CPTII,S$GLB,, | Performed by: FAMILY MEDICINE

## 2024-02-23 PROCEDURE — 99999 PR PBB SHADOW E&M-EST. PATIENT-LVL IV: CPT | Mod: PBBFAC,,, | Performed by: FAMILY MEDICINE

## 2024-02-23 PROCEDURE — 1160F RVW MEDS BY RX/DR IN RCRD: CPT | Mod: CPTII,S$GLB,, | Performed by: FAMILY MEDICINE

## 2024-02-23 PROCEDURE — 1159F MED LIST DOCD IN RCRD: CPT | Mod: CPTII,S$GLB,, | Performed by: FAMILY MEDICINE

## 2024-02-23 PROCEDURE — 99386 PREV VISIT NEW AGE 40-64: CPT | Mod: S$GLB,,, | Performed by: FAMILY MEDICINE

## 2024-02-23 PROCEDURE — 3008F BODY MASS INDEX DOCD: CPT | Mod: CPTII,S$GLB,, | Performed by: FAMILY MEDICINE

## 2024-02-23 PROCEDURE — 3079F DIAST BP 80-89 MM HG: CPT | Mod: CPTII,S$GLB,, | Performed by: FAMILY MEDICINE

## 2024-02-23 NOTE — PROGRESS NOTES
Subjective:     Patient ID: Dolly Torres is a 53 y.o. male.   Chief Complaint: Establish Care and Annual Exam    HPI:  Patient presents to establish care.  Patient is due for annual exam and labs.    No acute concerns.    Has hx hepatic hemangioma. Benign work-up, w/ last imaging in . Was advised to repeat studies in  but was lost to follow-up.        Review of Problems & History:  Patient Active Problem List   Diagnosis    Hemangioma of liver -- repeat ultrasound     History of endoscopy -- repeat EGD in  w/ colonoscopy      Past Medical History:   Diagnosis Date    Gallstones     Pancreatitis 2014      Past Surgical History:   Procedure Laterality Date    CHOLECYSTECTOMY  2014    ESOPHAGOGASTRODUODENOSCOPY        Social History     Socioeconomic History    Marital status:    Occupational History    Occupation: Works at dry    Tobacco Use    Smoking status: Former     Current packs/day: 0.00     Average packs/day: 1 pack/day for 15.0 years (15.0 ttl pk-yrs)     Types: Cigarettes     Start date: 9/10/1990     Quit date: 9/10/2005     Years since quittin.4   Substance and Sexual Activity    Alcohol use: Yes     Alcohol/week: 14.0 standard drinks of alcohol     Types: 14 Cans of beer per week    Drug use: Never    Sexual activity: Yes     Birth control/protection: None      Health Maintenance:  Colon Cancer Screening  Due for screening  Lung Cancer Screening  Pack-Years: Pack Years: 15  Prostate Cancer Screening  Patient requests screening  ASCVD Risk  The ASCVD Risk score (Maryann WILKINSON, et al., 2019) failed to calculate for the following reasons:    Cannot find a previous HDL lab    Cannot find a previous total cholesterol lab   Lab Results   Component Value Date    CHOL 203 (H) 2018    HDL 88 (H) 2018    TRIG 42 2018     Statin rx: no    Vaccines  Seasonal Influenza: UTD  COVID-19: Due for booster  RSV: Not indicated  Tetanus: UTD  Shingles: Due for  "primary series  Pneumococcal: Not indicated    Medication Review:    Current Outpatient Medications:     fish oil-omega-3 fatty acids 300-1,000 mg capsule, Take 2 g by mouth once daily., Disp: , Rfl:     multivitamin capsule, Take 1 capsule by mouth once daily., Disp: , Rfl:      Review of Systems   Constitutional:  Negative for chills, fatigue and fever.   HENT:  Negative for nasal congestion, ear pain, postnasal drip and sore throat.    Eyes:  Negative for visual disturbance.   Respiratory:  Negative for cough, shortness of breath and wheezing.    Cardiovascular:  Negative for chest pain and palpitations.   Gastrointestinal:  Negative for abdominal pain, change in bowel habit, constipation, diarrhea, nausea and vomiting.   Genitourinary:  Negative for dysuria and hematuria.   Musculoskeletal:  Negative for back pain, leg pain and neck pain.   Neurological:  Negative for dizziness, numbness and headaches.   Hematological:  Negative for adenopathy.   Psychiatric/Behavioral:  Negative for dysphoric mood, sleep disturbance and suicidal ideas. The patient is not nervous/anxious.           Objective:      Vitals:    02/23/24 1310   BP: 130/86   BP Location: Left arm   Patient Position: Sitting   BP Method: Medium (Manual)   Pulse: 80   SpO2: 98%   Weight: 59.8 kg (131 lb 13.4 oz)   Height: 5' 9" (1.753 m)      Physical Exam  Vitals and nursing note reviewed.   Constitutional:       General: He is not in acute distress.     Appearance: Normal appearance. He is not ill-appearing.   HENT:      Head: Normocephalic and atraumatic.      Right Ear: Tympanic membrane, ear canal and external ear normal. There is no impacted cerumen.      Left Ear: Tympanic membrane, ear canal and external ear normal. There is no impacted cerumen.      Nose: Nose normal. No congestion or rhinorrhea.      Mouth/Throat:      Mouth: Mucous membranes are moist.      Pharynx: Oropharynx is clear. No oropharyngeal exudate or posterior oropharyngeal " erythema.   Eyes:      General: No scleral icterus.        Right eye: No discharge.         Left eye: No discharge.      Conjunctiva/sclera: Conjunctivae normal.   Neck:      Thyroid: No thyroid mass, thyromegaly or thyroid tenderness.   Cardiovascular:      Rate and Rhythm: Normal rate and regular rhythm.      Pulses: Normal pulses.      Heart sounds: Normal heart sounds. No murmur heard.     No friction rub. No gallop.   Pulmonary:      Effort: Pulmonary effort is normal. No respiratory distress.      Breath sounds: Normal breath sounds. No wheezing, rhonchi or rales.   Abdominal:      General: Abdomen is flat. Bowel sounds are normal. There is no distension.      Palpations: Abdomen is soft. There is no mass.      Tenderness: There is no abdominal tenderness. There is no guarding.   Musculoskeletal:         General: No swelling or deformity. Normal range of motion.      Cervical back: Normal range of motion and neck supple. No tenderness.   Lymphadenopathy:      Cervical: No cervical adenopathy.   Skin:     General: Skin is warm and dry.   Neurological:      General: No focal deficit present.      Mental Status: He is alert and oriented to person, place, and time. Mental status is at baseline.      Gait: Gait normal.      Deep Tendon Reflexes: Reflexes normal.   Psychiatric:         Mood and Affect: Mood normal.         Behavior: Behavior normal.         Thought Content: Thought content normal.         Judgment: Judgment normal.           Assessment:       Problem List Items Addressed This Visit          Oncology    Hemangioma of liver -- repeat ultrasound 2020    Relevant Orders    US Abdomen Limited     Other Visit Diagnoses       Encounter for annual general medical examination without abnormal findings in adult    -  Primary    Relevant Orders    Comprehensive Metabolic Panel    CBC Auto Differential    Lipid Panel    Hemoglobin A1C    TSH    T4, Free    PSA, Screening    Hepatitis C Antibody    HIV 1/2 Ag/Ab  (4th Gen)    Encounter to establish care with new doctor        Need for vaccination        Encounter for screening for malignant neoplasm of colon        Relevant Orders    Fecal Immunochemical Test (iFOBT)    Encounter for screening for malignant neoplasm of prostate        Relevant Orders    PSA, Screening              Plan:       1. Encounter for annual general medical examination without abnormal findings in adult  Health maintenance updated  Chronic issues reviewed  Fasting labs ordered  - Comprehensive Metabolic Panel; Future  - CBC Auto Differential; Future  - Lipid Panel; Future  - Hemoglobin A1C; Future  - TSH; Future  - T4, Free; Future  - PSA, Screening; Future  - Hepatitis C Antibody; Future  - HIV 1/2 Ag/Ab (4th Gen); Future    2. Encounter to establish care with new doctor  Reviewed chronic medical conditions, updated problem list and medication list    3. Need for vaccination  COVID declined  Shingles declined    4. Encounter for screening for malignant neoplasm of colon  Discussed r/b/a of screening modalities  Pt wants to proceed w/ [employer] [facility] - advised that this test will need to be repeated annually  - Fecal Immunochemical Test (iFOBT); Future    5. Encounter for screening for malignant neoplasm of prostate  PSA ordered  - PSA, Screening; Future    6. Hemangioma of liver -- repeat ultrasound 2020  US ordered, anticipate reestablishing w/ GI/hepatology for clearance  - US Abdomen Limited; Future          Follow up in about 1 year (around 2/23/2025) for Annual Visit, Fasting labs.     Roberto Carlos Ernst MD, FAAFP  Family Medicine Physician  [facility]  02/23/2024

## 2024-02-28 ENCOUNTER — LAB VISIT (OUTPATIENT)
Dept: LAB | Facility: HOSPITAL | Age: 54
End: 2024-02-28
Payer: COMMERCIAL

## 2024-02-28 DIAGNOSIS — Z12.5 ENCOUNTER FOR SCREENING FOR MALIGNANT NEOPLASM OF PROSTATE: ICD-10-CM

## 2024-02-28 DIAGNOSIS — Z00.00 ENCOUNTER FOR ANNUAL GENERAL MEDICAL EXAMINATION WITHOUT ABNORMAL FINDINGS IN ADULT: ICD-10-CM

## 2024-02-28 LAB
ALBUMIN SERPL BCP-MCNC: 4.3 G/DL (ref 3.5–5.2)
ALP SERPL-CCNC: 70 U/L (ref 55–135)
ALT SERPL W/O P-5'-P-CCNC: 28 U/L (ref 10–44)
ANION GAP SERPL CALC-SCNC: 9 MMOL/L (ref 8–16)
AST SERPL-CCNC: 30 U/L (ref 10–40)
BASOPHILS # BLD AUTO: 0.05 K/UL (ref 0–0.2)
BASOPHILS NFR BLD: 0.9 % (ref 0–1.9)
BILIRUB SERPL-MCNC: 0.6 MG/DL (ref 0.1–1)
BUN SERPL-MCNC: 21 MG/DL (ref 6–20)
CALCIUM SERPL-MCNC: 9.9 MG/DL (ref 8.7–10.5)
CHLORIDE SERPL-SCNC: 107 MMOL/L (ref 95–110)
CHOLEST SERPL-MCNC: 241 MG/DL (ref 120–199)
CHOLEST/HDLC SERPL: 3.3 {RATIO} (ref 2–5)
CO2 SERPL-SCNC: 24 MMOL/L (ref 23–29)
COMPLEXED PSA SERPL-MCNC: 0.79 NG/ML (ref 0–4)
CREAT SERPL-MCNC: 0.9 MG/DL (ref 0.5–1.4)
DIFFERENTIAL METHOD BLD: NORMAL
EOSINOPHIL # BLD AUTO: 0 K/UL (ref 0–0.5)
EOSINOPHIL NFR BLD: 0.6 % (ref 0–8)
ERYTHROCYTE [DISTWIDTH] IN BLOOD BY AUTOMATED COUNT: 12.4 % (ref 11.5–14.5)
EST. GFR  (NO RACE VARIABLE): >60 ML/MIN/1.73 M^2
ESTIMATED AVG GLUCOSE: 108 MG/DL (ref 68–131)
GLUCOSE SERPL-MCNC: 100 MG/DL (ref 70–110)
HBA1C MFR BLD: 5.4 % (ref 4–5.6)
HCT VFR BLD AUTO: 47.2 % (ref 40–54)
HCV AB SERPL QL IA: NORMAL
HDLC SERPL-MCNC: 74 MG/DL (ref 40–75)
HDLC SERPL: 30.7 % (ref 20–50)
HGB BLD-MCNC: 15.8 G/DL (ref 14–18)
HIV 1+2 AB+HIV1 P24 AG SERPL QL IA: NORMAL
IMM GRANULOCYTES # BLD AUTO: 0.02 K/UL (ref 0–0.04)
IMM GRANULOCYTES NFR BLD AUTO: 0.4 % (ref 0–0.5)
LDLC SERPL CALC-MCNC: 150 MG/DL (ref 63–159)
LYMPHOCYTES # BLD AUTO: 1.5 K/UL (ref 1–4.8)
LYMPHOCYTES NFR BLD: 28.9 % (ref 18–48)
MCH RBC QN AUTO: 29.4 PG (ref 27–31)
MCHC RBC AUTO-ENTMCNC: 33.5 G/DL (ref 32–36)
MCV RBC AUTO: 88 FL (ref 82–98)
MONOCYTES # BLD AUTO: 0.3 K/UL (ref 0.3–1)
MONOCYTES NFR BLD: 5.7 % (ref 4–15)
NEUTROPHILS # BLD AUTO: 3.4 K/UL (ref 1.8–7.7)
NEUTROPHILS NFR BLD: 63.5 % (ref 38–73)
NONHDLC SERPL-MCNC: 167 MG/DL
NRBC BLD-RTO: 0 /100 WBC
PLATELET # BLD AUTO: 256 K/UL (ref 150–450)
PMV BLD AUTO: 9.5 FL (ref 9.2–12.9)
POTASSIUM SERPL-SCNC: 4.4 MMOL/L (ref 3.5–5.1)
PROT SERPL-MCNC: 7.3 G/DL (ref 6–8.4)
RBC # BLD AUTO: 5.37 M/UL (ref 4.6–6.2)
SODIUM SERPL-SCNC: 140 MMOL/L (ref 136–145)
T4 FREE SERPL-MCNC: 0.93 NG/DL (ref 0.71–1.51)
TRIGL SERPL-MCNC: 85 MG/DL (ref 30–150)
TSH SERPL DL<=0.005 MIU/L-ACNC: 1.37 UIU/ML (ref 0.4–4)
WBC # BLD AUTO: 5.3 K/UL (ref 3.9–12.7)

## 2024-02-28 PROCEDURE — 84153 ASSAY OF PSA TOTAL: CPT | Performed by: FAMILY MEDICINE

## 2024-02-28 PROCEDURE — 80061 LIPID PANEL: CPT | Performed by: FAMILY MEDICINE

## 2024-02-28 PROCEDURE — 84443 ASSAY THYROID STIM HORMONE: CPT | Performed by: FAMILY MEDICINE

## 2024-02-28 PROCEDURE — 87389 HIV-1 AG W/HIV-1&-2 AB AG IA: CPT | Performed by: FAMILY MEDICINE

## 2024-02-28 PROCEDURE — 84439 ASSAY OF FREE THYROXINE: CPT | Performed by: FAMILY MEDICINE

## 2024-02-28 PROCEDURE — 80053 COMPREHEN METABOLIC PANEL: CPT | Performed by: FAMILY MEDICINE

## 2024-02-28 PROCEDURE — 86803 HEPATITIS C AB TEST: CPT | Performed by: FAMILY MEDICINE

## 2024-02-28 PROCEDURE — 85025 COMPLETE CBC W/AUTO DIFF WBC: CPT | Performed by: FAMILY MEDICINE

## 2024-02-28 PROCEDURE — 83036 HEMOGLOBIN GLYCOSYLATED A1C: CPT | Performed by: FAMILY MEDICINE

## 2024-02-28 PROCEDURE — 36415 COLL VENOUS BLD VENIPUNCTURE: CPT | Performed by: FAMILY MEDICINE

## 2024-03-01 ENCOUNTER — HOSPITAL ENCOUNTER (OUTPATIENT)
Dept: RADIOLOGY | Facility: HOSPITAL | Age: 54
Discharge: HOME | End: 2024-03-01
Attending: FAMILY MEDICINE
Payer: COMMERCIAL

## 2024-03-01 ENCOUNTER — PATIENT MESSAGE (OUTPATIENT)
Dept: INTERNAL MEDICINE | Facility: CLINIC | Age: 54
End: 2024-03-01
Payer: COMMERCIAL

## 2024-03-01 DIAGNOSIS — D18.03 HEMANGIOMA OF LIVER: ICD-10-CM

## 2024-03-01 PROCEDURE — 76705 ECHO EXAM OF ABDOMEN: CPT | Mod: 26,,, | Performed by: STUDENT IN AN ORGANIZED HEALTH CARE EDUCATION/TRAINING PROGRAM

## 2024-03-01 PROCEDURE — 76705 ECHO EXAM OF ABDOMEN: CPT | Mod: TC

## 2024-06-10 ENCOUNTER — PATIENT MESSAGE (OUTPATIENT)
Dept: INTERNAL MEDICINE | Facility: CLINIC | Age: 54
End: 2024-06-10
Payer: COMMERCIAL

## 2024-10-31 ENCOUNTER — IMMUNIZATION (OUTPATIENT)
Dept: INTERNAL MEDICINE | Facility: CLINIC | Age: 54
End: 2024-10-31
Payer: COMMERCIAL

## 2024-10-31 DIAGNOSIS — Z23 NEED FOR VACCINATION: Primary | ICD-10-CM

## 2024-10-31 PROCEDURE — 90471 IMMUNIZATION ADMIN: CPT | Mod: S$GLB,,, | Performed by: INTERNAL MEDICINE

## 2024-10-31 PROCEDURE — 90656 IIV3 VACC NO PRSV 0.5 ML IM: CPT | Mod: S$GLB,,, | Performed by: INTERNAL MEDICINE

## 2024-11-08 ENCOUNTER — LAB VISIT (OUTPATIENT)
Dept: LAB | Facility: HOSPITAL | Age: 54
End: 2024-11-08
Attending: INTERNAL MEDICINE
Payer: COMMERCIAL

## 2024-11-08 ENCOUNTER — OFFICE VISIT (OUTPATIENT)
Dept: INTERNAL MEDICINE | Facility: CLINIC | Age: 54
End: 2024-11-08
Payer: COMMERCIAL

## 2024-11-08 VITALS
WEIGHT: 130.31 LBS | BODY MASS INDEX: 19.3 KG/M2 | HEIGHT: 69 IN | DIASTOLIC BLOOD PRESSURE: 90 MMHG | SYSTOLIC BLOOD PRESSURE: 160 MMHG | HEART RATE: 88 BPM | OXYGEN SATURATION: 100 %

## 2024-11-08 DIAGNOSIS — R22.1 NECK MASS: Primary | ICD-10-CM

## 2024-11-08 DIAGNOSIS — R22.1 NECK MASS: ICD-10-CM

## 2024-11-08 LAB
ANION GAP SERPL CALC-SCNC: 14 MMOL/L (ref 8–16)
BUN SERPL-MCNC: 16 MG/DL (ref 6–20)
CALCIUM SERPL-MCNC: 9.8 MG/DL (ref 8.7–10.5)
CHLORIDE SERPL-SCNC: 105 MMOL/L (ref 95–110)
CO2 SERPL-SCNC: 21 MMOL/L (ref 23–29)
CREAT SERPL-MCNC: 0.8 MG/DL (ref 0.5–1.4)
EST. GFR  (NO RACE VARIABLE): >60 ML/MIN/1.73 M^2
GLUCOSE SERPL-MCNC: 89 MG/DL (ref 70–110)
POTASSIUM SERPL-SCNC: 3.8 MMOL/L (ref 3.5–5.1)
SODIUM SERPL-SCNC: 140 MMOL/L (ref 136–145)
T4 FREE SERPL-MCNC: 0.93 NG/DL (ref 0.71–1.51)
TSH SERPL DL<=0.005 MIU/L-ACNC: 1.02 UIU/ML (ref 0.4–4)

## 2024-11-08 PROCEDURE — 84443 ASSAY THYROID STIM HORMONE: CPT | Performed by: INTERNAL MEDICINE

## 2024-11-08 PROCEDURE — 84439 ASSAY OF FREE THYROXINE: CPT | Performed by: INTERNAL MEDICINE

## 2024-11-08 PROCEDURE — 99999 PR PBB SHADOW E&M-EST. PATIENT-LVL V: CPT | Mod: PBBFAC,,, | Performed by: INTERNAL MEDICINE

## 2024-11-08 PROCEDURE — 80048 BASIC METABOLIC PNL TOTAL CA: CPT | Performed by: INTERNAL MEDICINE

## 2024-11-08 PROCEDURE — 36415 COLL VENOUS BLD VENIPUNCTURE: CPT | Performed by: INTERNAL MEDICINE

## 2024-11-08 NOTE — PATIENT INSTRUCTIONS
Labwork today  Schedule CT scan of your neck.   Schedule endocrinology appointment.     Check your blood pressures at home, twice a day, for 1 week, just to make sure that it is within reasonable range. Goal blood pressure <130/<80 (120s/70s).  Let the office know if your blood pressure is not near goal. Send either a patient message or call the office [phone] to schedule a nurse visit for blood pressure check / BP device check.     Return to clinic as needed.

## 2024-11-08 NOTE — PROGRESS NOTES
Subjective:       Patient ID: Dolly Torres is a 54 y.o. male.    Chief Complaint: Oral Swelling (Jose's apple swollen)      HPI  Dolly Torres is a 54 y.o. year old male established patient of Dr. Ernst with history of pancreatitis and gallstones presents for urgent care evaluation of neck mass. States his daughter noticed this yesterday. He states he noticed a slight change in his neck a few weeks ago, but never paid much more attention to it. States that he feels it has gotten bigger.     Denies difficulty swallowing solids or liquids. Denies fever, chills, palpitations, weight loss, dry skin, insomnia, headache, heat intolerance.    Review of Systems   Constitutional:  Negative for activity change, appetite change, chills, fatigue, fever and unexpected weight change.   HENT:  Negative for congestion, rhinorrhea and sore throat.    Eyes:  Negative for visual disturbance.   Respiratory:  Negative for shortness of breath.    Cardiovascular:  Negative for chest pain.   Gastrointestinal:  Negative for abdominal pain, diarrhea, nausea and vomiting.   Genitourinary:  Negative for difficulty urinating and dysuria.   Musculoskeletal:  Negative for arthralgias, back pain and myalgias.   Skin:  Negative for color change and rash.   Neurological:  Negative for dizziness, weakness and headaches.         Past Medical History:   Diagnosis Date    Gallstones     Pancreatitis 01/2014        Prior to Admission medications    Medication Sig Start Date End Date Taking? Authorizing Provider   fish oil-omega-3 fatty acids 300-1,000 mg capsule Take 2 g by mouth once daily.   Yes Provider, Historical   multivitamin capsule Take 1 capsule by mouth once daily.   Yes Provider, Historical        Past medical history, surgical history, and family medical history reviewed and updated as appropriate.    Medications and allergies reviewed.     Objective:          Vitals:    11/08/24 1313 11/08/24 1356   BP: (!) 160/80 (!) 160/90   BP Location: Right  "arm    Patient Position: Sitting    Pulse: 88    SpO2: 100%    Weight: 59.1 kg (130 lb 4.7 oz)    Height: 5' 9" (1.753 m)      Body mass index is 19.24 kg/m².  Physical Exam  Constitutional:       General: He is not in acute distress.     Appearance: He is well-developed.   HENT:      Head: Normocephalic and atraumatic.      Nose: Nose normal.   Eyes:      General: No scleral icterus.  Neck:      Thyroid: No thyromegaly.      Vascular: No JVD.      Trachea: No tracheal deviation.      Comments: Cystic mass surrounding shruthi's apple. Non mobile, non pulsatile. No bruit.  Cardiovascular:      Rate and Rhythm: Normal rate and regular rhythm.      Heart sounds: Normal heart sounds. No murmur heard.     No friction rub. No gallop.   Pulmonary:      Effort: Pulmonary effort is normal. No respiratory distress.      Breath sounds: Normal breath sounds. No wheezing or rales.   Abdominal:      General: There is no distension.      Palpations: Abdomen is soft. There is no mass.      Tenderness: There is no abdominal tenderness.   Musculoskeletal:         General: No tenderness. Normal range of motion.      Cervical back: Normal range of motion and neck supple.   Lymphadenopathy:      Cervical: No cervical adenopathy.   Skin:     General: Skin is warm and dry.      Findings: No rash.   Neurological:      Mental Status: He is alert and oriented to person, place, and time.      Cranial Nerves: No cranial nerve deficit.      Deep Tendon Reflexes: Reflexes normal.   Psychiatric:         Behavior: Behavior normal.                   Lab Results   Component Value Date    WBC 5.30 02/28/2024    HGB 15.8 02/28/2024    HCT 47.2 02/28/2024     02/28/2024    CHOL 241 (H) 02/28/2024    TRIG 85 02/28/2024    HDL 74 02/28/2024    ALT 28 02/28/2024    AST 30 02/28/2024     02/28/2024    K 4.4 02/28/2024     02/28/2024    CREATININE 0.9 02/28/2024    BUN 21 (H) 02/28/2024    CO2 24 02/28/2024    TSH 1.371 02/28/2024    PSA " 0.79 02/28/2024    HGBA1C 5.4 02/28/2024       Assessment:       1. Neck mass          Plan:     Dolly was seen today for oral swelling.    Diagnoses and all orders for this visit:    Neck mass  Comments:  favor thyroglossal duct cyst, will obtain CT soft tissue neck with and without contrast, refer to endocrinology; obtain TFTs  Orders:  -     TSH; Future  -     Cancel: US Soft Tissue Head Neck; Future  -     T4, FREE; Future  -     CT Soft Tissue Neck W WO Contrast; Future  -     BASIC METABOLIC PANEL; Future  -     Ambulatory referral/consult to Endocrinology; Future    If TGDC confirmed, will need FNA.     Health maintenance reviewed with patient.     Follow up if symptoms worsen or fail to improve.    William Garibay MD  Internal Medicine / Primary Care  [facility]  11/8/2024

## 2024-11-08 NOTE — Clinical Note
Saw this pt of yours, had a cystic mass around his shruhti's apple. Suspect thyroglossal duct cyst. Sent him for imaging and referred to germain; just an fyi

## 2024-11-13 ENCOUNTER — HOSPITAL ENCOUNTER (OUTPATIENT)
Dept: RADIOLOGY | Facility: HOSPITAL | Age: 54
Discharge: HOME | End: 2024-11-13
Attending: INTERNAL MEDICINE
Payer: COMMERCIAL

## 2024-11-13 DIAGNOSIS — R22.1 NECK MASS: ICD-10-CM

## 2024-11-13 PROCEDURE — 25500020 PHARM REV CODE 255: Performed by: INTERNAL MEDICINE

## 2024-11-13 PROCEDURE — 70491 CT SOFT TISSUE NECK W/DYE: CPT | Mod: TC

## 2024-11-13 PROCEDURE — 70491 CT SOFT TISSUE NECK W/DYE: CPT | Mod: 26,,, | Performed by: RADIOLOGY

## 2024-11-13 RX ADMIN — IOHEXOL 75 ML: 350 INJECTION, SOLUTION INTRAVENOUS at 03:11

## 2024-11-14 DIAGNOSIS — I10 ESSENTIAL HYPERTENSION: Primary | ICD-10-CM

## 2024-11-14 DIAGNOSIS — Q89.2 THYROGLOSSAL DUCT CYST: ICD-10-CM

## 2024-11-14 RX ORDER — AMLODIPINE BESYLATE 5 MG/1
5 TABLET ORAL DAILY
Qty: 90 TABLET | Refills: 3 | Status: SHIPPED | OUTPATIENT
Start: 2024-11-14 | End: 2025-11-14

## 2024-11-29 ENCOUNTER — OFFICE VISIT (OUTPATIENT)
Dept: OTOLARYNGOLOGY | Facility: CLINIC | Age: 54
End: 2024-11-29
Payer: COMMERCIAL

## 2024-11-29 VITALS
DIASTOLIC BLOOD PRESSURE: 103 MMHG | WEIGHT: 131.81 LBS | BODY MASS INDEX: 19.47 KG/M2 | SYSTOLIC BLOOD PRESSURE: 167 MMHG | HEART RATE: 85 BPM

## 2024-11-29 DIAGNOSIS — Q89.2 THYROGLOSSAL DUCT CYST: ICD-10-CM

## 2024-11-29 PROCEDURE — 99999 PR PBB SHADOW E&M-EST. PATIENT-LVL III: CPT | Mod: PBBFAC,,, | Performed by: STUDENT IN AN ORGANIZED HEALTH CARE EDUCATION/TRAINING PROGRAM

## 2024-11-30 NOTE — PROGRESS NOTES
OTOLARYNGOLOGY- FACIAL PLASTIC & RECONSTRUCTIVE SURGERY      Dolly Torres  [id number]    CC:  Chief Complaint   Patient presents with    Thyroglossal duct cyst       HISTORY OF PRESENT ILLNESS: Dolly Torres  is a 54 y.o. male who was referred for neck mass.  Patient noted that his young daughter had pointed out some swelling in his midline neck approximately 1 month ago.  He saw his primary care doctor and got a CT scan which showed a cyst consistent with a thyroglossal duct cyst.      The patient states this lesion has not been painful nor has it affected his ability to speak, breathe or swallow.    The patient does not smoke.  He drinks approximately 3 beers per night      Occupation/Family:  Works at a dry cleaner        Past Medical History  He has a past medical history of Gallstones and Pancreatitis.    Past Surgical History  He has a past surgical history that includes Cholecystectomy (03/2014) and Esophagogastroduodenoscopy (2013).    Family History  His family history includes Cancer in his maternal aunt and maternal uncle; Cancer (age of onset: 74) in his mother.    Social History  He reports that he quit smoking about 19 years ago. His smoking use included cigarettes. He started smoking about 34 years ago. He has a 15 pack-year smoking history. He does not have any smokeless tobacco history on file. He reports current alcohol use of about 14.0 standard drinks of alcohol per week. He reports that he does not use drugs.    Allergies  He has No Known Allergies.    Medications  He has a current medication list which includes the following prescription(s): amlodipine, fish oil-omega-3 fatty acids, and multivitamin.      Review of Systems      PHYSICAL EXAM:  BP (!) 167/103 (BP Location: Right arm, Patient Position: Sitting)   Pulse 85   Wt 59.8 kg (131 lb 13.4 oz)   BMI 19.47 kg/m²     General: Alert and oriented in no acute distress  Head and Face: Normocephaic, atruamatic  Nose: Anterior rhinoscopy reveals overall  normal appearing turbinates, septum midline, no obvious lesions or masses  Neurological: Cranial nerves are grossly intact  Skin: Skin texture/appearance is appropriate for age  Eyes:   EOMI, sclera clear  Ears: Pinna are normal in shape and position  EACs healthy appearing bilaterally  TMs clear without SHARON or perforation bilaterally  Oral cavity and Oropharynx: Mucous membranes moist without lesions present.  Tongue protrudes midline and palate elevates midline.  Neck:  There is a firm soft, ballotable mass just above the thyroid cartilage and below the hyoid in the midline.  It is approximately 4 cm  Lungs:breathing comfortably  Psychiatric:  Mood and affect are normal    Imaging:  CT neck 11/13/2024 personally reviewed with the patient:      ASSESSMENT:   1. Thyroglossal duct cyst            PLAN:     I counseled the patient on risks, benefits, alternatives including observation versus surgical excision.  Overall I recommended for surgical excision given the small but real risk of this cyst harboring a carcinoma.  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  I would plan to perform this at [facility] [place] on a Thursday and keep the patient overnight.  I given the patient my information and he will call our clinic should he decide to schedule surgery or schedule another preoperative evaluation.        I have spent approximately 40 minutes on this patient encounter. This includes face to face time and non-face to face time preparing to see the patient (eg, review of tests), obtaining and/or reviewing separately obtained history, documenting clinical information in the electronic or other health record, independently interpreting results and communicating results to the patient/family/caregiver, or care coordinator.      Vik Bedoya MD  Facial Plastic and Reconstructive Surgeon  [facility]

## 2025-03-24 NOTE — PROGRESS NOTES
Subjective:      Patient ID: Dolly Torres is a 54 y.o. male.    Chief Complaint:  No chief complaint on file.    History of Present Illness  Dolly Torres presents today for initial evaluation & management of thyroglossal duct cyst. This is his first visit with me.     With regards to thyroglossal duct cyst,  Consulted for neck mass      Dx with thyroglossal duct cyst and has seen ENT who has recommended surgery.    Noticed enlarging neck around Oct 2024 by his daughter. He feels the mass has gotten smaller since noticing. States it is sometimes hard and sometimes soft.   Denies difficulty breathing or swallowing   Denies voice changes   Denies FH of thyroid cancer    Quit smoking in 2004 11/13/2024 CT neck   COMPARISON:  None     FINDINGS:  Multilocular cystic lesion in the midline centered just below the hyoid bone, which with extension across the thyrohyoid membrane and imbedded in the deep strap musculature.  This measures up to 3.4 x 2.7 cm in maximal transverse dimension.  No discrete solid enhancing component.  No associated fat stranding or skin thickening.     No soft tissue mass or pathologic lymph node enlargement elsewhere in the neck.     Thyroid gland appears unremarkable.     Parotid and submandibular glands are unremarkable.     The soft tissues of the nasopharynx, oropharynx, hypopharynx and larynx are within normal limits.     Major vessels of the neck appear patent.     Limited intracranial evaluation is within normal limits.     The visualized paranasal sinuses and mastoid air cells are essentially clear.     Right upper lobe no sign clustered micro nodules with multifocal mucous plugging (posterior segment subsegmental airways)     Mild cervical spondylosis, but no lytic or blastic lesions are evident. 3     Impression:     3.4 cm midline cystic lesion centered around the thyrohyoid membrane, favored to represent a thyroglossal duct cyst.  No nodular enhancing component.     No soft tissue mass or  "pathological lymph node enlargement elsewhere in the neck.     Endobronchial mucous plugging with clustered micro nodules within the right upper lobe, likely representing infectious/inflammatory etiologies.     Lab Results   Component Value Date    TSH 1.023 11/08/2024    FREET4 0.93 11/08/2024     With regards to hypertension,    He is now on amlodipine   States BP around 130/80 at home    BP Readings from Last 3 Encounters:   03/26/25 (!) 152/90   11/29/24 (!) 167/103   11/08/24 (!) 160/90     With regards to weight gain,     States he has gained around 4kg over the last 6 months.   Denies exercise  He has dry cleaning business  Denies FH of diabetes  He is eating 2 meals daily; not following any kind of diet.   More fat foods   2 beers daily     Lab Results   Component Value Date    HGBA1C 5.4 02/28/2024     Wt Readings from Last 3 Encounters:   03/26/25 1102 62.3 kg (137 lb 5.6 oz)   11/29/24 1525 59.8 kg (131 lb 13.4 oz)   11/08/24 1313 59.1 kg (130 lb 4.7 oz)     Review of Systems  As above     Objective:   Physical Exam  Constitutional:       Appearance: Normal appearance.   Neck:      Thyroid: No thyromegaly.      Comments: Nontender, firm ballotable mass just above the thyroid cartilage and below the hyoid in the midline. It is approximately 4 cm  Pulmonary:      Effort: Pulmonary effort is normal.   Neurological:      Mental Status: He is alert.         Visit Vitals  BP (!) 152/90   Pulse (!) 113   Ht 5' 10" (1.778 m)   Wt 62.3 kg (137 lb 5.6 oz)   SpO2 96%   BMI 19.71 kg/m²        Lab Review:   Lab Results   Component Value Date    HGBA1C 5.4 02/28/2024      Lab Results   Component Value Date    CHOL 241 (H) 02/28/2024    HDL 74 02/28/2024    LDLCALC 150.0 02/28/2024    TRIG 85 02/28/2024    CHOLHDL 30.7 02/28/2024     Lab Results   Component Value Date     11/08/2024    K 3.8 11/08/2024     11/08/2024    CO2 21 (L) 11/08/2024    GLU 89 11/08/2024    BUN 16 11/08/2024    CREATININE 0.8 " "11/08/2024    CALCIUM 9.8 11/08/2024    PROT 7.3 02/28/2024    ALBUMIN 4.3 02/28/2024    BILITOT 0.6 02/28/2024    ALKPHOS 70 02/28/2024    AST 30 02/28/2024    ALT 28 02/28/2024    ANIONGAP 14 11/08/2024    ESTGFRAFRICA >60.0 10/24/2020    EGFRNONAA >60.0 10/24/2020    TSH 1.023 11/08/2024     No results found for: "[id number]"  Assessment and Plan     1. Thyroglossal duct cyst        2. Neck mass  Ambulatory referral/consult to Endocrinology    US Soft Tissue Head Neck    favor thyroglossal duct cyst, will obtain CT soft tissue neck with and without contrast, refer to endocrinology; obtain TFTs      3. Primary hypertension        4. Weight gain          Thyroglossal duct cyst  CT neck in Nov 2024 showed 3.4 cm midline cystic lesion centered around the thyrohyoid membrane, favored to represent a thyroglossal duct cyst  Discussed I recommend follow up with ENT for surgery to remove  Will check US     Primary hypertension  Now on amlodipine per PCP   Encouraged to make BP log for PCP and discuss digital medicine for BP     Weight gain  Reports has gained 4kg in the last 6 months.  Discussed likely unrelated to thyroid or other endocrine cause  Periodic check of fasting glucose and A1c  Given information on diet.     Follow up if symptoms worsen or fail to improve.      "

## 2025-03-26 ENCOUNTER — OFFICE VISIT (OUTPATIENT)
Dept: ENDOCRINOLOGY | Facility: CLINIC | Age: 55
End: 2025-03-26
Payer: COMMERCIAL

## 2025-03-26 VITALS
BODY MASS INDEX: 19.67 KG/M2 | HEART RATE: 113 BPM | HEIGHT: 70 IN | WEIGHT: 137.38 LBS | DIASTOLIC BLOOD PRESSURE: 90 MMHG | OXYGEN SATURATION: 96 % | SYSTOLIC BLOOD PRESSURE: 152 MMHG

## 2025-03-26 DIAGNOSIS — R63.5 WEIGHT GAIN: ICD-10-CM

## 2025-03-26 DIAGNOSIS — R22.1 NECK MASS: ICD-10-CM

## 2025-03-26 DIAGNOSIS — I10 PRIMARY HYPERTENSION: ICD-10-CM

## 2025-03-26 DIAGNOSIS — Q89.2 THYROGLOSSAL DUCT CYST: Primary | ICD-10-CM

## 2025-03-26 PROCEDURE — 99999 PR PBB SHADOW E&M-EST. PATIENT-LVL V: CPT | Mod: PBBFAC,,, | Performed by: NURSE PRACTITIONER

## 2025-03-26 PROCEDURE — 99203 OFFICE O/P NEW LOW 30 MIN: CPT | Mod: S$GLB,,, | Performed by: NURSE PRACTITIONER

## 2025-03-26 PROCEDURE — 1159F MED LIST DOCD IN RCRD: CPT | Mod: CPTII,S$GLB,, | Performed by: NURSE PRACTITIONER

## 2025-03-26 PROCEDURE — 3008F BODY MASS INDEX DOCD: CPT | Mod: CPTII,S$GLB,, | Performed by: NURSE PRACTITIONER

## 2025-03-26 PROCEDURE — 3080F DIAST BP >= 90 MM HG: CPT | Mod: CPTII,S$GLB,, | Performed by: NURSE PRACTITIONER

## 2025-03-26 PROCEDURE — 1160F RVW MEDS BY RX/DR IN RCRD: CPT | Mod: CPTII,S$GLB,, | Performed by: NURSE PRACTITIONER

## 2025-03-26 PROCEDURE — 3077F SYST BP >= 140 MM HG: CPT | Mod: CPTII,S$GLB,, | Performed by: NURSE PRACTITIONER

## 2025-03-26 NOTE — ASSESSMENT & PLAN NOTE
Reports has gained 4kg in the last 6 months.  Discussed likely unrelated to thyroid or other endocrine cause  Periodic check of fasting glucose and A1c  Given information on diet.

## 2025-03-26 NOTE — ASSESSMENT & PLAN NOTE
Now on amlodipine per PCP   Encouraged to make BP log for PCP and discuss digital medicine for BP

## 2025-03-26 NOTE — ASSESSMENT & PLAN NOTE
CT neck in Nov 2024 showed 3.4 cm midline cystic lesion centered around the thyrohyoid membrane, favored to represent a thyroglossal duct cyst  Discussed I recommend follow up with ENT for surgery to remove  Will check US

## 2025-03-26 NOTE — PATIENT INSTRUCTIONS
Hypertension - high BP    Continue current medication    Check BP for PCP    Please discuss digital medicine for BP with your new PCP     Weight Gain    Check A1c with next set of labs    A1c 5.7% prediabetes   A1c 6.5% is diabetes   Your A1c was 5.4% in Feb 2024    Thyroglossal Duct Cyst   Discussed I recommend follow up with ENT   Can do US to ensure thyroid without abnormalities   Follow up with   Discussed if we drain cyst it may re-accumulate fluid and will eventually need surgery  Vik Bedoya MD     Snacks can be an important part of a balanced, healthy meal plan. They allow you to eat more frequently, feeling full and satisfied throughout the day. Also, they allow you to spread carbohydrates evenly, which may stabilize blood sugars.  Plus, snacks are enjoyable!     The amount of carbohydrate needed at snacks varies. Generally, about 15-30 grams of carbohydrate per snack is recommended.  Below you will find some tasty treats.       0-5 gm carb  Crystal Light  Vitamin Water Zero  Herbal tea, unsweetened  2 tsp peanut butter on celery  1./2 cup sugar-free jell-o  1 sugar-free popsicle  ¼ cup blueberries  8oz Blue Lisbeth unsweetened almond milk  5 baby carrots & celery sticks, cucumbers, bell peppers dipped in ¼ cup salsa, 2Tbsp light ranch dressing or 2Tbsp plain Greek yogurt  10 Goldfish crackers  ½ oz low-fat cheese or string cheese  1 closed handful of nuts, unsalted  1 Tbsp of sunflower seeds, unsalted  1 cup Smart Pop popcorn  1 whole grain brown rice cake        15 gm carb  1 small piece of fruit or ½ banana or 1/2 cup lite canned fruit  3 alfa cracker squares  3 cups Smart Pop popcorn, top spray butter, Jung lite salt or cinnamon and Truvia  5 Vanilla Wafers  ½ cup low fat, no added sugar ice cream or frozen yogurt (Blue bell, Blue Bunny, Weight Watchers, Skinny Cow)  ½ turkey, ham, or chicken sandwich  ½ c fruit with ½ c Cottage cheese  4-6 unsalted wheat crackers with 1 oz low fat cheese or 1  tbsp peanut butter   30-45 goldfish crackers (depending on flavor)   7-8 Religion mini brown rice cakes (caramel, apple cinnamon, chocolate)   12 Religion mini brown rice cakes (cheddar, bbq, ranch)   1/3 cup hummus dip with raw veg  1/2 whole wheat kvng, 1Tbsp hummus  Mini Pizza (1/2 whole wheat English muffin, low-fat  cheese, tomato sauce)  100 calorie snack pack (Oreo, Chips Ahoy, Ritz Mix, Baked Cheetos)  4-6 oz. light or Greek Style yogurt (Chobani, Yoplait, Okios, [place])  ½ cup sugar-free pudding    6 in. wheat tortilla or kvng oven toasted chips (topped with spray butter flavoring, cinnamon, Truvia OR spray butter, garlic powder, chili powder)   18 BBQ Popchips (available at [employer], [employer], Fresh Market)     Eating the Right Number of Calories (5339-2829 Guidelines)    Calories are a measure of the energy you get from food. If you eat more calories than you use, you will gain weight. If you eat fewer calories than you use, you will lose weight. Below are tables that give the number of calories needed each day. Look for your gender, age, and activity level. If you stick to this number, you should neither gain nor lose weight. Note that this is an estimated number of calories.* Your exact number may differ.    Women  Age in years Low activity level (calories/day) Moderate activity level (calories/day) High activity level (calories/day)   19 to 30 1,800-2,000 2,000-2,200 2,400   31 to 50 1,800 2,000 2,200   51 and older 1,600 1,800 2,000-2,200      Men  Age in years Low activity level  (calories/day) Moderate activity level (calories/day) High activity level (calories/day)   19 to 30 2,400-2,600 2,600-2,800 3,000   31 to 50 2,200-2,400 2,400-2,600 2,800-3,000   51 and older 2,000-2,200 2,200-2,400 2,400-2,800     Activity levels defined  Low. Only light physical activity such as that done during typical daily life.  Moderate. Light physical activity done during typical daily life AND physical activity  equal to walking about 1.5 to 3 miles a day at 3 to 4 miles per hour.  High. Light physical activity done during typical daily life AND physical activity equal to walking more than 3 miles a day at 3 to 4 miles per hour.  *From Dietary Guidelines for Americans, 9929-1525, [place] Department of Health and Human Services.    Eat less fat  A gram of fat has almost 2.5 times the calories of a gram of protein or carbohydrates. Try to balance your food choices so that only 20% to 35% of your calories comes from total fat. This means an average of 2½ to 3½ grams of fat for each 100 calories you eat.    Eat more fiber  High-fiber foods are digested more slowly than low-fiber foods, so you feel full longer. Try to get at least 25 grams of fiber each day for a 2000 calorie diet. Foods high in fiber include:  Vegetables and fruits  Whole-grain or bran breads, pastas, and cereals  Legumes (beans) and peas  As you begin to eat more fiber, be sure to drink plenty of water to keep your digestive system working smoothly.    Tips  Do's and don'ts include:   Dont skip meals. This often leads to overeating later on. Its best to spread your eating throughout the day.  Eat a variety of foods, not just a few favorites.  If you find yourself eating when youre not hungry, ask yourself why. Many of us eat when were bored, stressed, or just to be polite. Listen to your body. If youre not hungry, get busy doing something else instead of eating.  Eat slower, shooting for 20 to 30 minutes for each meal. It takes 20 minutes for your stomach to tell your brain that its full. Slow eaters tend to eat less and are still satisfied, while fast eaters may tend to be overeaters.   Pay attention to what you eat. Dont read or watch TV during your meal.     ---------------------------------------------------------------------------------------------------------------------------------------------------    Losing Weight for Heart Health  Excess weight  is a major risk factor for heart disease. Losing weight has many benefits including lowering your blood pressure, improving your cholesterol level, and decreasing your risk for diseases such as diabetes and heart disease. It may help keep your arteries open so that your heart can get the oxygen-rich blood it needs. All in all, losing weight makes you healthier.       Exercise with a friend. When activity is fun, you're more likely to stick with it.     Calories and weight loss  Calories are the fuel your body burns for energy. You get the calories you need from the food you eat. For healthy weight loss, women should eat at least 1,200 calories a day, men at least 1,500.  When you eat more calories than you need, your body stores the extra calories as fat. One pound of fat equals 3,500 calories.  To lose weight, try to reduce your total calorie intake by 500 calories. To do this, eat 250 calories less each day. Add activity to burn the other 250 calories. Walking 2.5 miles burns about 250 calories. Other more intense activities can burn more calories in the time you spend doing them, such as swimming and running. It is important to understand that reducing calorie intake is much more effective at weight loss than is exercise.  Eat a variety of healthy foods to get the nutrients you need.    Tips for losing weight  Drink 8 to 10 glasses of water a day.  Dont skip meals. Instead, eat smaller portions.  Eat your meals earlier in the day.  Cut out sugary drinks such as soda and fruit juices.  Make your later meals lighter than your earlier meals.     What can exercise help?  Blood sugar. Regular exercise improves blood sugar control by helping your body use insulin.  Mental and emotional health. Physical activity relieves stress and helps you sleep better.  Heart health. With regular exercise, you can reduce your risk of heart disease and high blood pressure. You can also improve your cholesterol and triglyceride  levels.  Weight. Exercise helps you lose fat, gain muscle, and control your weight.  Health of blood vessels and nerves. Activity helps lower blood sugar. This helps prevent damage to blood vessels and nerves that can cause problems with your brain, eyes, feet, and legs.  Finances. If you manage your blood sugar, you may spend less on medical care.    2 types of exercise  Two types of exercise help your body use blood sugar. Experts advise both types of exercise for people with diabetes:  Aerobic exercise. This is a rhythmic, repeated, continued movement of large muscle groups for at least 10 minutes at a time. You should do this about 30 minutes a day on most days of the week. Examples include walking, bicycling, jogging, swimming, water aerobics, and many sports.  Resistance exercise (strength training). This type of exercise uses muscles to move weight or work against resistance. You can do it with free weights, machines, resistance tubing, or your own body weight. Adults with diabetes should aim for 2 to 3 sessions of resistance exercise each week. Its best to skip a day in between.    A goal to shoot for  Your main goal is to become more active. Even a little bit helps. Choose an activity that you like. Walking is one great form of exercise that everyone can do. Talk to your healthcare provider about any limits you may have before starting with an exercise program. Then aim for 150 minutes a week of physical activity. Dont let more than 2 days go by without exercise. When you are sitting for long periods of time, get up for short sessions of light activity every 30 minutes.    Getting activity into your day  Being more active doesnt have to be hard work. Try these to get more activity into your day:  Take the stairs instead of the elevator  Garden, do housework, and yard work  Choose a parking space farther from the store  Walk to talk to a co-worker instead of calling  Take a 10-minute walk around the block  at lunch  Walk to a bus stop a little farther from your home or office  Walk the dog after dinner     ---------------------------------------------------------------------------------------------------------------------------------------------------    Reading Food Labels  Look for the Nutrition Facts label on packaged foods. Reading labels is a big step toward eating healthier. The tips below help you know what to look for.    Serving size. Read this closely because the package, jar, or can may contain more than 1 serving. This is how to measure 1 serving of the food in the package. If you eat more than 1 serving, you get more of everything on the label -- including fat, cholesterol, and calories.  Total fat. This tells you how many grams (g) of fat are in 1 serving. Fat is high in calories. A healthy goal is to have less than 25% of your daily calories come from fat.  Saturated fat. This tells you how much saturated fat is in 1 serving. Saturated fat raises your cholesterol the most. Look for foods that have little or no saturated fat.  Trans fat. This tells you how much trans fat is in 1 serving. Even a small amount of trans fat can harm your health. Choose foods that have no trans fat.  Cholesterol. This tells you how much cholesterol is in 1 serving. For many years, it had been recommended to eat less than 300 milligrams (mg) of cholesterol a day. New guidelines have removed this limitation as cholesterol has been recently shown to not raise blood cholesterol levels as significantly as previously thought. However, many foods high in cholesterol are also high in saturated fat. It is recommended to limit saturated fat in your diet.  Calories from fat. This number tells you how many calories from fat are in 1 serving (there are 9 calories per gram of fat). Look for foods with few calories from fat.  % Daily value. The higher the number, the more 1 serving has of that nutrient. Look for foods that have low  numbers for total fat, saturated fat, cholesterol, and sodium.  Sodium. This tells you how much sodium (salt) is in 1 serving. Choose foods with low numbers for sodium.  Dietary fiber. This number tells you how much fiber is in 1 serving. Foods that are high in fiber can help you feel full. They can also be good for your heart and digestion. The recommended daily amount of fiber is 25 grams for women and 38 grams for men. After age 50, your daily fiber needs drop to 21 grams for women and 30 grams for men.       ---------------------------------------------------------------------------------------------------------------------------------------------------    Understanding Carbohydrates, Fats, and Protein  Food is a source of fuel and nourishment for your body. Its also a source of pleasure. Having diabetes doesnt mean you have to eat special foods or give up desserts. Instead, your dietitian can show you how to plan meals to suit your body. To start, learn how different foods affect blood sugar.    Carbohydrates  Carbohydrates are the main source of fuel for the body. Carbohydrates raise blood sugar. Many people think carbohydrates are only found in pasta or bread. But carbohydrates are actually in many kinds of foods:  Sugars occur naturally in foods such as fruit, milk, honey, and molasses. Sugars can also be added to many foods, from cereals and yogurt to candy and desserts. Sugars raise blood sugar.  Starches are found in bread, cereals, pasta, and dried beans. Theyre also found in corn, peas, potatoes, yam, acorn squash, and butternut squash. Starches also raise blood sugar.   Fiber is found in foods such as vegetables, fruits, beans, and whole grains. Unlike other carbs, fiber isnt digested or absorbed. So it doesnt raise blood sugar. In fact, fiber can help keep blood sugar from rising too fast. It also helps keep blood cholesterol at a healthy level.  Did you know?  Even though carbohydrates raise  blood sugar, its best to have some in every meal. They are an important part of a healthy diet.     Fat  Fat is an energy source that can be stored until needed. Fat does not raise blood sugar. However, it can raise blood cholesterol, increasing the risk of heart disease. Fat is also high in calories, which can cause weight gain. Not all types of fat are the same.    More Healthy:  Monounsaturated fats are mostly found in vegetable oils, such as olive, canola, and peanut oils. They are also found in avocados and some nuts. Monounsaturated fats are healthy for your heart. Thats because they lower LDL (unhealthy) cholesterol.  Polyunsaturated fats are mostly found in vegetable oils, such as corn, safflower, and soybean oils. They are also found in some seeds, nuts, and fish. Polyunsaturated fats lower LDL (unhealthy) cholesterol. So, choosing them instead of saturated fats is healthy for your heart. Certain unsaturated fats can help lower triglycerides.     Less Healthy:  Saturated fats are found in animal products, such as meat, poultry, whole milk, lard, and butter. Saturated fats raise LDL cholesterol and are not healthy for your heart.  Hydrogenated oils and trans fats are formed when vegetable oils are processed into solid fats. They are found in many processed foods. Hydrogenated oils and trans fats raise LDL cholesterol and lower HDL (healthy) cholesterol. They are not healthy for your heart.    Protein  Protein helps the body build and repair muscle and other tissue. Protein has little or no effect on blood sugar. However, many foods that contain protein also contain saturated fat. By choosing low-fat protein sources, you can get the benefits of protein without the extra fat:  Plant protein is found in dry beans and peas, nuts, and soy products, such as tofu and soymilk. These sources tend to be cholesterol-free and low in saturated fat.  Animal protein is found in fish, poultry, meat, cheese, milk, and  eggs. These contain cholesterol and can be high in saturated fat. Aim for lean, lower-fat choices.    ---------------------------------------------------------------------------------------------------------------------------------------------------    Understanding Carbohydrates    A car needs the right type of fuel to run. And you need the right kind of food to function. To keep your energy level up, your body needs food that has carbohydrates. But carbohydrates raise blood sugar levels higher and faster than other kinds of food. Your dietitian will work with you to figure out the amount of carbohydrates you need.    Starches  Starches are found in grains, some vegetables, and beans. Grain products include bread, pasta, cereal, and tortillas. Starchy vegetables include potatoes, peas, corn, lima beans, yams, and squash. Kidney beans, garnett beans, black beans, garbanzo beans, and lentils also contain starches.    Sugars  Sugars are found naturally in many foods. Or sugar can be added. Foods that contain natural sugar include fruits and fruit juices, dairy products, honey, and molasses. Added sugars are found in most desserts, processed foods, candy, regular soda, and fruit drinks. These are very helpful for treating low blood sugar, or hypoglycemia. They provide sugar quickly. Try to keep at least 15 to 20 grams of these simple sugars with you at all times. Eat this if you begin to have low blood sugar symptoms.    Fiber  Fiber comes from plant foods. Most fiber isnt digested by the body. Instead of raising blood sugar levels like other carbohydrates, it actually stops blood sugar from rising too fast. Fiber is found in fruits, vegetables, whole grains, beans, peas, and many nuts.    Carb counting  Keep track of the amount of carbohydrates you eat. This can help you keep the right balance of physical activity and medicine. The amount of carbohydrates needed will vary for each person. It depends on many things such  as your health, the medicines you take, and how active you are. Your healthcare team will help you figure out the right amount of carbohydrates for you. You may start with around 45 to 60 grams of carbohydrate per meal, depending on your situation. Carb counting is a system that helps you keep track of the carbohydrates you eat at each meal.  Carbohydrates come from a variety of foods. These include grains, starchy vegetables, fruit, milk, beans, and snack foods. You can either count carbohydrate grams or carbohydrate servings. When you count carbohydrate servings, 1 carbohydrate serving = 15 grams of carbohydrates.  Here are some examples of foods containing about 15 grams of carbohydrates (1 serving of carbohydrates):  1/2 cup of canned or frozen fruit  A small piece of fresh fruit (4 ounces)  1 slice of bread  1/2 cup of oatmeal  1/3 cup of rice  4 to 6 crackers  1/2 English muffin  1/2 cup of black beans  1/4 of a large baked potato (3 ounces)  2/3 cup of plain fat-free yogurt  1 cup of soup  1/2 cup of casserole  6 chicken nuggets  2-inch-square brownie or cake without frosting  2 small cookies  1/2 cup of ice cream or sherbet    Carb counting is easier when food labels are available. Look at the label to see how many grams of total carbohydrates the food contains. Then you can figure out how much you should eat.  Two very important lines to look at on the label are the serving size and the total carbohydrate amount. Here are some tips for using food labels to count your carbohydrate intake:  Check the serving size. The information on the label is based on that serving size. If you eat more than the listed serving size, you may have to double or triple the other information on the label.   Check the total grams of carbohydrates. Total carbohydrate from the label includes sugar, starch, and fiber. Be sure to use the total carbohydrate number and not sugar alone.  Know how many grams of carbohydrates you can  have.  Be familiar with the matching portion sizes.  Compare labels. Compare the labels of different products, looking at serving sizes and total carbohydrates to find the products that work best for you.   Don't forget protein and fat. With all the focus on carb counting, it might be easy to forget protein and fat in your meals. Don't forget to include sources of protein and healthy fat to balance your meals.  Its also important to be consistent with the amount and time you eat when taking a fixed dose of diabetes medicine. Work with your healthcare provider or dietitian if you need additional help. He or she can help you keep track of your carbohydrate intake. He or she can also help you figure out how many grams of carbohydrates you should have.      ---------------------------------------------------------------------------------------------------------------------------------------------------    Diabetes: Learning About Serving and Portion Sizes     A good rule of thumb: Devote half your plate to vegetables and green salad. Split the other half between protein and starchy carbohydrates. Fruit makes a good dessert.     Servings and portions. Whats the difference? These terms can be very confusing. But learning to measure serving sizes can help you figure out how many carbohydrates (carbs) and other foods you eat each day. They are also powerful tools for managing your weight.    Servings and portions  Many different words are used to describe amounts of food. If your health care provider uses a term youre not sure of, dont be afraid to ask. It helps to know the difference between servings and portions:  A serving size is a fixed size. Food producers use this term to describe their products. For example, the label on a cereal box could say that 1 cup of dry cereal = 1 serving.  A portion (also called a helping) is how much you eat or how much you put on your plate at a meal. For example, you might eat 2  "cups of cereal at breakfast.    Using serving information  The portion you choose to eat (such as 2 cups of cereal) may be more than one serving as listed on the food label (such as 1 cup of cereal). Thats why it helps to measure or weigh the food you eat. Because the food label values are based on servings, youll need to know how many servings you eat at one sitting.     Ounces: 2 to 3 ounces is about the size of your palm.       1 Cup: 1 cup (or a medium-sized piece) is about the size of your fist.       1/2 Cup: 1/2 cup is about the size of your cupped hand.      One tablespoon is about the size of your thumb.  One teaspoon is about the size of the tip of your thumb.    Keeping track of serving sizes  When youre planning for a snack or a meal, keep servings in mind. If you dont have measuring cups or a scale handy, there are ways to eyeball serving sizes, such as comparing your food to the size of your hand (see pictures above).    Managing portion sizes  If your weight is a concern, reducing your portions can help. You can eat more than one serving of a food at once. But to keep from eating too much at one meal, learn how to manage your portions. A portion is the amount of each type of food on your plate. See the plate diagram for an example of balanced portions.    ---------------------------------------------------------------------------------------------------------------------------------------------------    Diabetes: Shopping for and Preparing Meals    Having diabetes doesnt mean you have to shop in a special aisle or look for special foods. But you will need to make choices. By comparing items and reading food labels, you can find the healthiest foods for you and your family.  Comparing items  When you shop, compare items to find the best ones for your needs. Keep these facts in mind:  No sugar added does not mean a product is sugar-free.  "Sugar-free" means less than 1/2 gram (g) of sugar per " serving.  Fat free means less than 1/2 g of fat per serving. This does not necessarily mean the product is low in calories.  Low fat means 3 g fat or less per serving. Reduced fat or less fat means 25% less fat than the regular version. Some of this fat may be saturated or trans fat. And calories per serving may be similar to the regular version.    Making small changes  Dont try to change all of your eating habits at once. Here are some ideas to start with:  Try fat-free or low-fat cheese, milk, and yogurt. Also try leaner cuts of meat. This will help you cut down on saturated fat.  Try whole-grain breads, brown rice, and whole-wheat pasta.  Load up on fresh or frozen vegetables. If you buy canned, choose low-sodium vegetables.  Avoid processed foods as much as possible. They tend to be low in fiber and high in trans fats and sodium.  Try tofu, soymilk, or meat substitutes.?They can help you cut cholesterol and saturated fat out of your diet.    Learning to read food labels  To find healthy foods that help you control blood sugar, learn how to read food labels. Look for the Nutrition Facts label on packaged foods. It will tell you how much carbohydrate, sugar, fat, and fiber is in each serving. Then, you can decide whether or not the food fits into your meal plan.    Using the food label  So, once you have the food label, what do you do with it? The food label helps in many ways. Use it to:  Compare items and decide which is the best for your health needs.  Track the number of carbohydrates in your portions.  Figure out how many servings of a food you can have and still stay within the number of carbohydrates for that meal.    Planning meals  For good blood sugar control, plan what and when youll eat. Start by creating a meal plan that includes all the food groups. Then, time your meals to help keep your blood sugar level steady. You may need to adjust your plan for special situations.    Eat from all the  food groups  The basis of a healthy meal plan is variety (eating many different types of foods). Look for lean meats, fresh fruits and vegetables, whole grains, and low-fat or non-fat dairy products. Eating a wide variety of foods provides the nutrients your body needs. It can also keep you from getting bored with your meal plan.    Reduce liquid sugars  Extra calories from sodas, sports drinks, and fruit drinks make it hard to keep blood sugar in range. Cut as many liquid sugars from your meal plan as you can. This includes most fruit juices, which are often high in natural or added sugar. Instead, drink plenty of water and other sugar-free beverages.    Eat less fat  If you need to lose some weight, try to reduce the amount of fat in your diet. This can also help lower your cholesterol level to keep blood vessels healthier. Cut fat by using only small amounts of liquid oil for cooking. Read food labels carefully to avoid foods with unhealthy trans fats.    Timing your meals  When it comes to blood sugar control, when you eat is as important as what you eat. You may need to eat several small meals spaced evenly throughout the day to stay in your target range. So dont skip breakfast or wait until late in the day to get most of your calories. Doing so can cause your blood sugar to rise too high or fall too low.    Cooking wisely  Broil, steam, bake, or grill meats and vegetables, instead of frying.  Instead of cream-based sauces or sugary glazes, flavor foods with vegetable purée, lemon or lime juice, or herb seasonings.  Remove skin from chicken and turkey before serving.  Look in cookbooks for easy, low-fat, low-sugar recipes. When making your usual recipes, cut sugar by 1/2 and fat by 1/3.      ---------------------------------------------------------------------------------------------------------------------------------------------------    Healthy Meals for Diabetes    Ask your healthcare team to help you make  a meal plan that fits your needs. Your meal plan tells you when to eat your meals and snacks, what kinds of foods to eat, and how much of each food to eat. You dont have to give up all the foods you like. But you do need to follow some guidelines.  Choose healthy carbohydrates  Starches, sugars, and fiber are all types of carbohydrates. Fiber can help lower your cholesterol and triglycerides. Fiber is also healthy for your heart. You should have 20 to 35 grams of total fiber each day. Fiber-rich foods include:  Whole-grain breads and cereals  Bulgur wheat  Brown rice     Whole-wheat pasta  Fruits and vegetables  Dry beans, and peas   Keep track of the amount of carbohydrates you eat. This can help you keep the right balance of physical activity and medicine. The amount of carbohydrates needed will vary for each person. It depends on many things such as your health, the medicines you take, and how active you are. Your healthcare team will help you figure out the right amount of carbohydrates for you. You may start with around 45 to 60 grams of carbohydrates per meal, depending on your situation.   Here are some examples of foods containing about 15 grams of carbohydrates (1 serving of carbohydrates):  1/2 cup of canned or frozen fruit  A small piece of fresh fruit (4 ounces)  1 slice of bread  1/2 cup of oatmeal  1/3 cup of rice  4 to 6 crackers  1/2 English muffin  1/2 cup of black beans  1/4 of a large baked potato (3 ounces)  2/3 cup of plain fat-free yogurt  1 cup of soup  1/2 cup of casserole  6 chicken nuggets  2-inch-square brownie or cake without frosting  2 small cookies  1/2 cup of ice cream or sherbet    Choose healthy protein foods  Eating protein that is low in fat can help you control your weight. It also helps keep your heart healthy. Low-fat protein foods include:  Fish  Plant proteins, such as dry beans and peas, nuts, and soy products like tofu and soymilk  Lean meat with all visible fat  removed  Poultry with the skin removed  Low-fat or nonfat milk, cheese, and yogurt    Limit unhealthy fats and sugar  Saturated and trans fats are unhealthy for your heart. They raise LDL (bad) cholesterol. Fat is also high in calories, so it can make you gain weight. To cut down on unhealthy fats and sugar, limit these foods:  Butter or margarine  Palm and palm kernel oils and coconut oil  Cream  Cheese  Mane  Lunch meats     Ice cream  Sweet bakery goods such as pies, muffins, and donuts  Jams and jellies  Candy bars  Regular sodas     How much to eat  The amount of food you eat affects your blood sugar. It also affects your weight. Your healthcare team will tell you how much of each type of food you should eat.  Use measuring cups and spoons and a food scale to measure serving sizes.  Learn what a correct serving size looks like on your plate. This will help when you are away from home and cant measure your servings.  Eat only the number of servings given on your meal plan for each food. Dont take seconds.  Learn to read food labels. Be sure to look at serving size, total carbohydrates, fiber, calories, sugar, and saturated and trans fats. Look for healthier alternatives to foods that have added sugar.  Plan ahead for parties so you can still have a good time without going overboard with unhealthy food choices. Set a good example yourself by bringing a healthy dish to pot lucks.     Choose healthy snacks  When it comes to snacks, we usually think about foods with added sugar and fats. But there are many other options for healthier snack choices. Here are a few snack ideas to choose from:  Snacks with less than 5 grams of carbohydrates  1 piece of string cheese  3 celery sticks plus 1 tablespoon of peanut butter  5 cherry tomatoes plus 1 tablespoon of ranch dressing  1 hard-boiled egg  1/4 cup of fresh blueberries   5 baby carrots  1 cup of light popcorn  1/2 cup of sugar-free gelatin  15 almonds  Snacks with  about 10 to 20 grams of carbohydrates  1/3 cup of hummus plus 1 cup of fresh cut nonstarchy vegetables (carrots, green peppers, broccoli, celery, or a combination)  1/2 cup of fresh or canned fruit plus 1/4 cup of cottage cheese  1/2 cup of tuna salad with 4 crackers  2 rice cakes and a tablespoon of peanut butter  1 small apple or orange  3 cups light popcorn  1/2 of a turkey sandwich (1 slice of whole-wheat bread, 2 ounces of turkey, and mustard)  Portion sizes are important to controlling your blood sugar and staying at a healthy weight. Stock up on healthy snack items so you always have them on hand.    When to eat  Your meal plan will likely include breakfast, lunch, dinner, and some snacks.  Try to eat your meals and snacks at about the same times each day.  Eat all your meals and snacks. Skipping a meal or snack can make your blood sugar drop too low. It can also cause you to eat too much at the next meal or snack. Then your blood sugar could get too high.    ---------------------------------------------------------------------------------------------------------------------------------------------------    Eating Out When You Have Diabetes  Eating right is an important part of keeping your blood sugar in your target range. You just need to make healthy choices.    Be creative when eating out. Many places dont make you stick strictly to the menu. Instead of ordering a large entree, choose an appetizer or two and a bowl of soup. A mix of side orders can also make a good meal. Read the descriptions of other entrees and specials. If another entree comes with baby carrots, ask for a side order of them even if they dont come with your entree. Ask how food is prepared or if it can be made differently.  Tips for making the most of your meal out  Ask to have high-fat, high-calorie extras, such as French fries and potato chips, left off your plate so you wont be tempted.   Ask what substitutions are available.  Instead of French fries, you may be able to have a side of salad with low-calorie dressing.  Order low-fat milk instead of cream for your coffee.  Ask for vegetables and main courses to be served without sauces, butter, margarine, or oil.  Be aware of portion size. You dont have to clean your plate. Take half your meal home in a doggie bag to eat the next day.  Look for heart-healthy or low-fat entrees that include whole grains, vegetables, or fruits.  Choose foods that are grilled, broiled, or steamed.  Avoid dishes that are described on the menu as fried, breaded, smothered, rich, or creamy.    Tips for restaurant meals  When you eat away from home try these tips:  Try to schedule your dining-out meal at your normal meal time. Make a reservation if possible, so you don't have to wait to eat. If you can't make a reservation, try to arrive at the restaurant at a less-busy time to cut down your wait time. Eat a small fruit or starch snack at your regular mealtime if your restaurant meal is going to be later than usual.   Call ahead to see if the restaurant can meet your dietary needs if you've never been there before. Or you can go online to see the menu ahead of time.  Carry some crackers with you in case the restaurant needs you to wait until you can be served.  Ask how foods are prepared before you order.  Instead of fried, sautéed, or breaded foods, choose ones that are broiled, steamed, grilled, or baked.  Ask for sauces, gravies, and dressings on the side.  Only eat an amount that fits your meal plan. Remember: You can take home the leftovers.  Save dessert for special occasions. Then choose a small dessert or share one with a friend or family member.    Make healthy choices  Fast food  Garden salad with light dressing on the side  Baked potato with vegetables or herbs  Broiled, roasted, or grilled chicken sandwich  Sliced turkey or lean roast beef sandwich    Mexican  Chicken enchilada, without cheese or sour  cream   Small burrito with whole beans and chicken  Whole beans (not refried) and rice  Chicken or fish fajitas    Steakhouse  Grilled or broiled lean cuts of beef  Baked potato with vegetables or herbs  Broiled or baked chicken. Dont eat the skin.  Steamed vegetables    Asian  Steamed dumplings or potstickers  Broiled, boiled, or steamed meats or fish  Sushi or sashimi  Steamed rice or boiled noodles. One serving is equal to 1/3 cup.      © 5902-4408 [employer]. [address] [place] [id number]. All rights reserved. This information is not intended as a substitute for professional medical care. Always follow your healthcare professional's instructions.

## 2025-04-09 ENCOUNTER — PATIENT OUTREACH (OUTPATIENT)
Dept: ADMINISTRATIVE | Facility: HOSPITAL | Age: 55
End: 2025-04-09
Payer: COMMERCIAL

## 2025-04-09 NOTE — PROGRESS NOTES
Chart reviewed and updated. Reconciled immunizations, health maintenance and care everywhere.  Sent portal msg for remote BP.      Renetta Yang CMA  Panel Care Coordinator  [facility]  [phone]  For Roberto Carlos Ernst MD

## 2025-06-17 ENCOUNTER — PATIENT MESSAGE (OUTPATIENT)
Dept: ADMINISTRATIVE | Facility: HOSPITAL | Age: 55
End: 2025-06-17
Payer: COMMERCIAL

## 2025-07-11 ENCOUNTER — PATIENT MESSAGE (OUTPATIENT)
Dept: ADMINISTRATIVE | Facility: HOSPITAL | Age: 55
End: 2025-07-11
Payer: COMMERCIAL

## 2025-08-20 ENCOUNTER — OFFICE VISIT (OUTPATIENT)
Dept: INTERNAL MEDICINE | Facility: CLINIC | Age: 55
End: 2025-08-20
Payer: COMMERCIAL

## 2025-08-20 VITALS
SYSTOLIC BLOOD PRESSURE: 150 MMHG | HEIGHT: 70 IN | HEART RATE: 96 BPM | OXYGEN SATURATION: 99 % | DIASTOLIC BLOOD PRESSURE: 88 MMHG | WEIGHT: 134.25 LBS | BODY MASS INDEX: 19.22 KG/M2

## 2025-08-20 DIAGNOSIS — N30.01 ACUTE CYSTITIS WITH HEMATURIA: Primary | ICD-10-CM

## 2025-08-20 LAB
BILIRUB SERPL-MCNC: NEGATIVE MG/DL
BILIRUB UR QL STRIP.AUTO: NEGATIVE
BLOOD URINE, POC: ABNORMAL
CLARITY UR: CLEAR
CLARITY, POC UA: CLEAR
COLOR UR AUTO: YELLOW
COLOR, POC UA: YELLOW
GLUCOSE UR QL STRIP: NEGATIVE
GLUCOSE UR QL STRIP: NEGATIVE
HGB UR QL STRIP: ABNORMAL
KETONES UR QL STRIP: NEGATIVE
KETONES UR QL STRIP: NEGATIVE
LEUKOCYTE ESTERASE UR QL STRIP: NEGATIVE
LEUKOCYTE ESTERASE URINE, POC: NEGATIVE
NITRITE UR QL STRIP: NEGATIVE
NITRITE, POC UA: NEGATIVE
PH UR STRIP: 6 [PH]
PH, POC UA: 6
PROT UR QL STRIP: NEGATIVE
PROTEIN, POC: ABNORMAL
SP GR UR STRIP: 1.02
SPECIFIC GRAVITY, POC UA: >1.03
UROBILINOGEN UR STRIP-ACNC: NEGATIVE EU/DL
UROBILINOGEN, POC UA: ABNORMAL

## 2025-08-20 PROCEDURE — 81002 URINALYSIS NONAUTO W/O SCOPE: CPT | Mod: S$GLB,,, | Performed by: FAMILY MEDICINE

## 2025-08-20 PROCEDURE — 3008F BODY MASS INDEX DOCD: CPT | Mod: CPTII,S$GLB,, | Performed by: FAMILY MEDICINE

## 2025-08-20 PROCEDURE — 99213 OFFICE O/P EST LOW 20 MIN: CPT | Mod: GE,S$GLB,, | Performed by: FAMILY MEDICINE

## 2025-08-20 PROCEDURE — 3077F SYST BP >= 140 MM HG: CPT | Mod: CPTII,S$GLB,, | Performed by: FAMILY MEDICINE

## 2025-08-20 PROCEDURE — 81003 URINALYSIS AUTO W/O SCOPE: CPT

## 2025-08-20 PROCEDURE — 3079F DIAST BP 80-89 MM HG: CPT | Mod: CPTII,S$GLB,, | Performed by: FAMILY MEDICINE

## 2025-08-20 PROCEDURE — 99999 PR PBB SHADOW E&M-EST. PATIENT-LVL III: CPT | Mod: PBBFAC,,,

## 2025-08-20 RX ORDER — SULFAMETHOXAZOLE AND TRIMETHOPRIM 800; 160 MG/1; MG/1
1 TABLET ORAL 2 TIMES DAILY
Qty: 14 TABLET | Refills: 0 | Status: SHIPPED | OUTPATIENT
Start: 2025-08-20 | End: 2025-08-27

## 2025-08-21 LAB
CAOX CRY UR QL COMP ASSIST: ABNORMAL
HOLD SPECIMEN: NORMAL
MICROSCOPIC COMMENT: ABNORMAL
RBC #/AREA URNS AUTO: 19 /HPF (ref 0–4)
SQUAMOUS #/AREA URNS AUTO: <1 /HPF
WBC #/AREA URNS AUTO: 32 /HPF (ref 0–5)